# Patient Record
Sex: FEMALE | Race: WHITE | Employment: UNEMPLOYED | URBAN - METROPOLITAN AREA
[De-identification: names, ages, dates, MRNs, and addresses within clinical notes are randomized per-mention and may not be internally consistent; named-entity substitution may affect disease eponyms.]

---

## 2017-06-14 ENCOUNTER — HOSPITAL ENCOUNTER (OUTPATIENT)
Age: 82
Setting detail: SPECIMEN
Discharge: HOME | End: 2017-06-14
Payer: MEDICARE

## 2017-06-14 LAB
-: NORMAL
ABSOLUTE EOS #: 0.3 K/UL (ref 0–0.4)
ABSOLUTE LYMPH #: 2.9 K/UL (ref 1–4.8)
ABSOLUTE MONO #: 0.8 K/UL (ref 0.1–1.2)
ALBUMIN SERPL-MCNC: 4 G/DL (ref 3.5–5.2)
ALBUMIN/GLOBULIN RATIO: 1.4 (ref 1–2.5)
ALP BLD-CCNC: 87 U/L (ref 35–104)
ALT SERPL-CCNC: 12 U/L (ref 5–33)
AMORPHOUS: NORMAL
ANION GAP SERPL CALCULATED.3IONS-SCNC: 15 MMOL/L (ref 9–17)
AST SERPL-CCNC: 20 U/L
BACTERIA: NORMAL
BASOPHILS # BLD: 1 %
BASOPHILS ABSOLUTE: 0.1 K/UL (ref 0–0.2)
BILIRUB SERPL-MCNC: 0.5 MG/DL (ref 0.3–1.2)
BILIRUBIN URINE: NEGATIVE
BUN BLDV-MCNC: 12 MG/DL (ref 8–23)
BUN/CREAT BLD: NORMAL (ref 9–20)
CALCIUM SERPL-MCNC: 9.9 MG/DL (ref 8.6–10.4)
CASTS UA: NORMAL /LPF (ref 0–8)
CHLORIDE BLD-SCNC: 101 MMOL/L (ref 98–107)
CHOLESTEROL, FASTING: 263 MG/DL
CHOLESTEROL/HDL RATIO: 2.1
CO2: 24 MMOL/L (ref 20–31)
COLOR: YELLOW
COMMENT UA: ABNORMAL
CREAT SERPL-MCNC: 0.68 MG/DL (ref 0.5–0.9)
CRYSTALS, UA: NORMAL /HPF
DIFFERENTIAL TYPE: NORMAL
EOSINOPHILS RELATIVE PERCENT: 3 %
EPITHELIAL CELLS UA: NORMAL /HPF (ref 0–5)
GFR AFRICAN AMERICAN: >60 ML/MIN
GFR NON-AFRICAN AMERICAN: >60 ML/MIN
GFR SERPL CREATININE-BSD FRML MDRD: NORMAL ML/MIN/{1.73_M2}
GFR SERPL CREATININE-BSD FRML MDRD: NORMAL ML/MIN/{1.73_M2}
GLUCOSE FASTING: 95 MG/DL (ref 70–99)
GLUCOSE URINE: NEGATIVE
HCT VFR BLD CALC: 44.3 % (ref 36–46)
HDLC SERPL-MCNC: 124 MG/DL
HEMOGLOBIN: 14.5 G/DL (ref 12–16)
KETONES, URINE: NEGATIVE
LDL CHOLESTEROL: 113 MG/DL (ref 0–130)
LEUKOCYTE ESTERASE, URINE: NEGATIVE
LYMPHOCYTES # BLD: 36 %
MCH RBC QN AUTO: 28.8 PG (ref 26–34)
MCHC RBC AUTO-ENTMCNC: 32.7 G/DL (ref 31–37)
MCV RBC AUTO: 88.2 FL (ref 80–100)
MONOCYTES # BLD: 10 %
MUCUS: NORMAL
NITRITE, URINE: NEGATIVE
OTHER OBSERVATIONS UA: NORMAL
PDW BLD-RTO: 15.4 % (ref 12.5–15.4)
PH UA: 7.5 (ref 5–8)
PLATELET # BLD: 398 K/UL (ref 140–450)
PLATELET ESTIMATE: NORMAL
PMV BLD AUTO: 8.6 FL (ref 6–12)
POTASSIUM SERPL-SCNC: 3.9 MMOL/L (ref 3.7–5.3)
PROTEIN UA: NEGATIVE
RBC # BLD: 5.02 M/UL (ref 4–5.2)
RBC # BLD: NORMAL 10*6/UL
RBC UA: NORMAL /HPF (ref 0–4)
RENAL EPITHELIAL, UA: NORMAL /HPF
SEG NEUTROPHILS: 50 %
SEGMENTED NEUTROPHILS ABSOLUTE COUNT: 4.1 K/UL (ref 1.8–7.7)
SODIUM BLD-SCNC: 140 MMOL/L (ref 135–144)
SPECIFIC GRAVITY UA: 1.01 (ref 1–1.03)
TOTAL PROTEIN: 6.8 G/DL (ref 6.4–8.3)
TRICHOMONAS: NORMAL
TRIGLYCERIDE, FASTING: 129 MG/DL
TURBIDITY: ABNORMAL
URINE HGB: NEGATIVE
UROBILINOGEN, URINE: NORMAL
VLDLC SERPL CALC-MCNC: ABNORMAL MG/DL (ref 1–30)
WBC # BLD: 8.2 K/UL (ref 3.5–11)
WBC # BLD: NORMAL 10*3/UL
WBC UA: NORMAL /HPF (ref 0–5)
YEAST: NORMAL

## 2017-06-22 ENCOUNTER — HOSPITAL ENCOUNTER (OUTPATIENT)
Age: 82
Setting detail: SPECIMEN
Discharge: HOME | End: 2017-06-22
Payer: MEDICARE

## 2017-06-22 LAB
BILIRUBIN URINE: NEGATIVE
COLOR: YELLOW
COMMENT UA: NORMAL
GLUCOSE URINE: NEGATIVE
KETONES, URINE: NEGATIVE
LEUKOCYTE ESTERASE, URINE: NEGATIVE
NITRITE, URINE: NEGATIVE
PH UA: 6.5 (ref 5–8)
PROTEIN UA: NEGATIVE
SPECIFIC GRAVITY UA: 1.01 (ref 1–1.03)
TURBIDITY: CLEAR
URINE HGB: NEGATIVE
UROBILINOGEN, URINE: NORMAL

## 2019-04-30 ENCOUNTER — HOSPITAL ENCOUNTER (OUTPATIENT)
Age: 84
Setting detail: SPECIMEN
Discharge: HOME | End: 2019-04-30
Payer: MEDICARE

## 2019-04-30 LAB
ABSOLUTE EOS #: 0.3 K/UL (ref 0–0.44)
ABSOLUTE IMMATURE GRANULOCYTE: <0.03 K/UL (ref 0–0.3)
ABSOLUTE LYMPH #: 2.99 K/UL (ref 1.1–3.7)
ABSOLUTE MONO #: 0.97 K/UL (ref 0.1–1.2)
ALBUMIN SERPL-MCNC: 4.7 G/DL (ref 3.5–5.2)
ALBUMIN/GLOBULIN RATIO: 1.6 (ref 1–2.5)
ALP BLD-CCNC: 103 U/L (ref 35–104)
ALT SERPL-CCNC: 13 U/L (ref 5–33)
ANION GAP SERPL CALCULATED.3IONS-SCNC: 20 MMOL/L (ref 9–17)
AST SERPL-CCNC: 22 U/L
BASOPHILS # BLD: 1 % (ref 0–2)
BASOPHILS ABSOLUTE: 0.11 K/UL (ref 0–0.2)
BILIRUB SERPL-MCNC: 0.48 MG/DL (ref 0.3–1.2)
BILIRUBIN URINE: NEGATIVE
BUN BLDV-MCNC: 14 MG/DL (ref 8–23)
BUN/CREAT BLD: ABNORMAL (ref 9–20)
CALCIUM SERPL-MCNC: 10.2 MG/DL (ref 8.6–10.4)
CHLORIDE BLD-SCNC: 103 MMOL/L (ref 98–107)
CHOLESTEROL, FASTING: 277 MG/DL
CHOLESTEROL/HDL RATIO: 1.9
CO2: 21 MMOL/L (ref 20–31)
COLOR: YELLOW
COMMENT UA: ABNORMAL
CREAT SERPL-MCNC: 0.65 MG/DL (ref 0.5–0.9)
DIFFERENTIAL TYPE: ABNORMAL
EOSINOPHILS RELATIVE PERCENT: 4 % (ref 1–4)
GFR AFRICAN AMERICAN: >60 ML/MIN
GFR NON-AFRICAN AMERICAN: >60 ML/MIN
GFR SERPL CREATININE-BSD FRML MDRD: ABNORMAL ML/MIN/{1.73_M2}
GFR SERPL CREATININE-BSD FRML MDRD: ABNORMAL ML/MIN/{1.73_M2}
GLUCOSE FASTING: 85 MG/DL (ref 70–99)
GLUCOSE URINE: NEGATIVE
HCT VFR BLD CALC: 48.8 % (ref 36.3–47.1)
HDLC SERPL-MCNC: 144 MG/DL
HEMOGLOBIN: 15.4 G/DL (ref 11.9–15.1)
IMMATURE GRANULOCYTES: 0 %
KETONES, URINE: NEGATIVE
LDL CHOLESTEROL: 113 MG/DL (ref 0–130)
LEUKOCYTE ESTERASE, URINE: NEGATIVE
LYMPHOCYTES # BLD: 34 % (ref 24–43)
MCH RBC QN AUTO: 29.6 PG (ref 25.2–33.5)
MCHC RBC AUTO-ENTMCNC: 31.6 G/DL (ref 28.4–34.8)
MCV RBC AUTO: 93.7 FL (ref 82.6–102.9)
MONOCYTES # BLD: 11 % (ref 3–12)
NITRITE, URINE: NEGATIVE
NRBC AUTOMATED: 0 PER 100 WBC
PDW BLD-RTO: 14.5 % (ref 11.8–14.4)
PH UA: 7 (ref 5–8)
PLATELET # BLD: 418 K/UL (ref 138–453)
PLATELET ESTIMATE: ABNORMAL
PMV BLD AUTO: 10.5 FL (ref 8.1–13.5)
POTASSIUM SERPL-SCNC: 3.8 MMOL/L (ref 3.7–5.3)
PROTEIN UA: NEGATIVE
RBC # BLD: 5.21 M/UL (ref 3.95–5.11)
RBC # BLD: ABNORMAL 10*6/UL
SEG NEUTROPHILS: 50 % (ref 36–65)
SEGMENTED NEUTROPHILS ABSOLUTE COUNT: 4.29 K/UL (ref 1.5–8.1)
SODIUM BLD-SCNC: 144 MMOL/L (ref 135–144)
SPECIFIC GRAVITY UA: 1 (ref 1–1.03)
THYROXINE, FREE: 1.56 NG/DL (ref 0.93–1.7)
TOTAL PROTEIN: 7.7 G/DL (ref 6.4–8.3)
TRIGLYCERIDE, FASTING: 101 MG/DL
TSH SERPL DL<=0.05 MIU/L-ACNC: 2.2 MIU/L (ref 0.3–5)
TURBIDITY: CLEAR
URINE HGB: NEGATIVE
UROBILINOGEN, URINE: NORMAL
VLDLC SERPL CALC-MCNC: ABNORMAL MG/DL (ref 1–30)
WBC # BLD: 8.7 K/UL (ref 3.5–11.3)
WBC # BLD: ABNORMAL 10*3/UL

## 2019-05-06 ENCOUNTER — HOSPITAL ENCOUNTER (OUTPATIENT)
Age: 84
Setting detail: SPECIMEN
Discharge: HOME | End: 2019-05-06
Payer: MEDICARE

## 2019-05-06 LAB
HCT VFR BLD CALC: 47 % (ref 36.3–47.1)
HEMOGLOBIN: 14.8 G/DL (ref 11.9–15.1)
MCH RBC QN AUTO: 29.6 PG (ref 25.2–33.5)
MCHC RBC AUTO-ENTMCNC: 31.5 G/DL (ref 28.4–34.8)
MCV RBC AUTO: 94 FL (ref 82.6–102.9)
NRBC AUTOMATED: 0 PER 100 WBC
PDW BLD-RTO: 14.3 % (ref 11.8–14.4)
PLATELET # BLD: 419 K/UL (ref 138–453)
PMV BLD AUTO: 10.7 FL (ref 8.1–13.5)
RBC # BLD: 5 M/UL (ref 3.95–5.11)
WBC # BLD: 8.9 K/UL (ref 3.5–11.3)

## 2020-02-12 ENCOUNTER — HOSPITAL ENCOUNTER (OUTPATIENT)
Age: 85
Setting detail: SPECIMEN
Discharge: HOME | End: 2020-02-12
Payer: MEDICARE

## 2020-02-12 LAB
ABSOLUTE EOS #: 0.17 K/UL (ref 0–0.44)
ABSOLUTE IMMATURE GRANULOCYTE: 0.03 K/UL (ref 0–0.3)
ABSOLUTE LYMPH #: 2.97 K/UL (ref 1.1–3.7)
ABSOLUTE MONO #: 0.99 K/UL (ref 0.1–1.2)
ALBUMIN SERPL-MCNC: 4.5 G/DL (ref 3.5–5.2)
ALBUMIN/GLOBULIN RATIO: 1.6 (ref 1–2.5)
ALP BLD-CCNC: 97 U/L (ref 35–104)
ALT SERPL-CCNC: 15 U/L (ref 5–33)
ANION GAP SERPL CALCULATED.3IONS-SCNC: 18 MMOL/L (ref 9–17)
AST SERPL-CCNC: 23 U/L
BASOPHILS # BLD: 1 % (ref 0–2)
BASOPHILS ABSOLUTE: 0.1 K/UL (ref 0–0.2)
BILIRUB SERPL-MCNC: 0.33 MG/DL (ref 0.3–1.2)
BILIRUBIN URINE: NEGATIVE
BUN BLDV-MCNC: 10 MG/DL (ref 8–23)
BUN/CREAT BLD: ABNORMAL (ref 9–20)
CALCIUM SERPL-MCNC: 9.9 MG/DL (ref 8.6–10.4)
CHLORIDE BLD-SCNC: 98 MMOL/L (ref 98–107)
CO2: 24 MMOL/L (ref 20–31)
COLOR: YELLOW
COMMENT UA: NORMAL
CREAT SERPL-MCNC: 0.64 MG/DL (ref 0.5–0.9)
DIFFERENTIAL TYPE: ABNORMAL
EOSINOPHILS RELATIVE PERCENT: 2 % (ref 1–4)
GFR AFRICAN AMERICAN: >60 ML/MIN
GFR NON-AFRICAN AMERICAN: >60 ML/MIN
GFR SERPL CREATININE-BSD FRML MDRD: ABNORMAL ML/MIN/{1.73_M2}
GFR SERPL CREATININE-BSD FRML MDRD: ABNORMAL ML/MIN/{1.73_M2}
GLUCOSE BLD-MCNC: 53 MG/DL (ref 70–99)
GLUCOSE URINE: NEGATIVE
HCT VFR BLD CALC: 52 % (ref 36.3–47.1)
HEMOGLOBIN: 16 G/DL (ref 11.9–15.1)
IMMATURE GRANULOCYTES: 0 %
KETONES, URINE: NEGATIVE
LEUKOCYTE ESTERASE, URINE: NEGATIVE
LYMPHOCYTES # BLD: 33 % (ref 24–43)
MCH RBC QN AUTO: 28.9 PG (ref 25.2–33.5)
MCHC RBC AUTO-ENTMCNC: 30.8 G/DL (ref 28.4–34.8)
MCV RBC AUTO: 94 FL (ref 82.6–102.9)
MONOCYTES # BLD: 11 % (ref 3–12)
NITRITE, URINE: NEGATIVE
NRBC AUTOMATED: 0 PER 100 WBC
PDW BLD-RTO: 14.5 % (ref 11.8–14.4)
PH UA: 6 (ref 5–8)
PLATELET # BLD: 418 K/UL (ref 138–453)
PLATELET ESTIMATE: ABNORMAL
PMV BLD AUTO: 10.5 FL (ref 8.1–13.5)
POTASSIUM SERPL-SCNC: 3.9 MMOL/L (ref 3.7–5.3)
PROTEIN UA: NEGATIVE
RBC # BLD: 5.53 M/UL (ref 3.95–5.11)
RBC # BLD: ABNORMAL 10*6/UL
SEG NEUTROPHILS: 53 % (ref 36–65)
SEGMENTED NEUTROPHILS ABSOLUTE COUNT: 4.73 K/UL (ref 1.5–8.1)
SODIUM BLD-SCNC: 140 MMOL/L (ref 135–144)
SPECIFIC GRAVITY UA: 1.01 (ref 1–1.03)
TOTAL PROTEIN: 7.3 G/DL (ref 6.4–8.3)
TSH SERPL DL<=0.05 MIU/L-ACNC: 1.46 MIU/L (ref 0.3–5)
TURBIDITY: CLEAR
URINE HGB: NEGATIVE
UROBILINOGEN, URINE: NORMAL
WBC # BLD: 9 K/UL (ref 3.5–11.3)
WBC # BLD: ABNORMAL 10*3/UL

## 2021-07-23 PROBLEM — E16.2 LOW BLOOD SUGAR: Status: ACTIVE | Noted: 2021-07-23

## 2021-07-23 PROBLEM — D58.2 ELEVATED HEMOGLOBIN (HCC): Status: ACTIVE | Noted: 2021-07-23

## 2021-07-23 PROBLEM — E78.00 HYPERCHOLESTEREMIA: Status: ACTIVE | Noted: 2021-07-23

## 2021-07-23 PROBLEM — E04.1 THYROID NODULE: Status: ACTIVE | Noted: 2021-07-23

## 2021-07-23 PROBLEM — Z85.828 HX OF BASAL CELL CARCINOMA: Status: ACTIVE | Noted: 2021-07-23

## 2021-07-23 PROBLEM — I10 HYPERTENSION: Status: ACTIVE | Noted: 2021-07-23

## 2021-07-23 PROBLEM — F32.A MILD DEPRESSION: Status: ACTIVE | Noted: 2021-07-23

## 2021-07-23 PROBLEM — I77.9 BILATERAL CAROTID ARTERY DISEASE (HCC): Status: ACTIVE | Noted: 2021-07-23

## 2021-08-04 ENCOUNTER — ANESTHESIA EVENT (OUTPATIENT)
Dept: OPERATING ROOM | Age: 86
End: 2021-08-04
Payer: MEDICARE

## 2021-08-04 RX ORDER — M-VIT,TX,IRON,MINS/CALC/FOLIC 27MG-0.4MG
1 TABLET ORAL DAILY
COMMUNITY

## 2021-08-09 ENCOUNTER — ANESTHESIA (OUTPATIENT)
Dept: OPERATING ROOM | Age: 86
End: 2021-08-09
Payer: MEDICARE

## 2021-08-09 ENCOUNTER — HOSPITAL ENCOUNTER (OUTPATIENT)
Age: 86
Setting detail: OUTPATIENT SURGERY
Discharge: HOME | End: 2021-08-09
Attending: SURGERY | Admitting: SURGERY
Payer: MEDICARE

## 2021-08-09 VITALS
WEIGHT: 101 LBS | DIASTOLIC BLOOD PRESSURE: 73 MMHG | SYSTOLIC BLOOD PRESSURE: 145 MMHG | BODY MASS INDEX: 18.58 KG/M2 | HEIGHT: 62 IN | RESPIRATION RATE: 16 BRPM | OXYGEN SATURATION: 98 % | TEMPERATURE: 97.3 F | HEART RATE: 64 BPM

## 2021-08-09 VITALS
DIASTOLIC BLOOD PRESSURE: 53 MMHG | SYSTOLIC BLOOD PRESSURE: 111 MMHG | RESPIRATION RATE: 11 BRPM | OXYGEN SATURATION: 100 %

## 2021-08-09 PROCEDURE — 3700000001 HC ADD 15 MINUTES (ANESTHESIA): Performed by: SURGERY

## 2021-08-09 PROCEDURE — 2500000003 HC RX 250 WO HCPCS: Performed by: NURSE ANESTHETIST, CERTIFIED REGISTERED

## 2021-08-09 PROCEDURE — 3600000003 HC SURGERY LEVEL 3 BASE: Performed by: SURGERY

## 2021-08-09 PROCEDURE — 3700000000 HC ANESTHESIA ATTENDED CARE: Performed by: SURGERY

## 2021-08-09 PROCEDURE — 6360000002 HC RX W HCPCS: Performed by: NURSE ANESTHETIST, CERTIFIED REGISTERED

## 2021-08-09 PROCEDURE — 2580000003 HC RX 258: Performed by: ANESTHESIOLOGY

## 2021-08-09 PROCEDURE — 7100000010 HC PHASE II RECOVERY - FIRST 15 MIN: Performed by: SURGERY

## 2021-08-09 PROCEDURE — 2500000003 HC RX 250 WO HCPCS: Performed by: SURGERY

## 2021-08-09 PROCEDURE — 2709999900 HC NON-CHARGEABLE SUPPLY: Performed by: SURGERY

## 2021-08-09 PROCEDURE — 2580000003 HC RX 258: Performed by: SURGERY

## 2021-08-09 PROCEDURE — 3600000013 HC SURGERY LEVEL 3 ADDTL 15MIN: Performed by: SURGERY

## 2021-08-09 PROCEDURE — 88305 TISSUE EXAM BY PATHOLOGIST: CPT

## 2021-08-09 PROCEDURE — 6370000000 HC RX 637 (ALT 250 FOR IP): Performed by: SURGERY

## 2021-08-09 PROCEDURE — 7100000011 HC PHASE II RECOVERY - ADDTL 15 MIN: Performed by: SURGERY

## 2021-08-09 RX ORDER — SODIUM CHLORIDE 0.9 % (FLUSH) 0.9 %
10 SYRINGE (ML) INJECTION PRN
Status: DISCONTINUED | OUTPATIENT
Start: 2021-08-09 | End: 2021-08-09 | Stop reason: HOSPADM

## 2021-08-09 RX ORDER — FENTANYL CITRATE 50 UG/ML
25 INJECTION, SOLUTION INTRAMUSCULAR; INTRAVENOUS EVERY 5 MIN PRN
Status: DISCONTINUED | OUTPATIENT
Start: 2021-08-09 | End: 2021-08-09 | Stop reason: HOSPADM

## 2021-08-09 RX ORDER — LIDOCAINE HYDROCHLORIDE 20 MG/ML
JELLY TOPICAL
Status: DISCONTINUED
Start: 2021-08-09 | End: 2021-08-09 | Stop reason: WASHOUT

## 2021-08-09 RX ORDER — PHENYLEPHRINE HCL IN 0.9% NACL 1 MG/10 ML
SYRINGE (ML) INTRAVENOUS PRN
Status: DISCONTINUED | OUTPATIENT
Start: 2021-08-09 | End: 2021-08-09 | Stop reason: SDUPTHER

## 2021-08-09 RX ORDER — SODIUM CHLORIDE 9 MG/ML
INJECTION, SOLUTION INTRAVENOUS CONTINUOUS
Status: DISCONTINUED | OUTPATIENT
Start: 2021-08-09 | End: 2021-08-09 | Stop reason: HOSPADM

## 2021-08-09 RX ORDER — BUPIVACAINE HYDROCHLORIDE AND EPINEPHRINE 5; 5 MG/ML; UG/ML
INJECTION, SOLUTION PERINEURAL
Status: DISCONTINUED
Start: 2021-08-09 | End: 2021-08-09 | Stop reason: WASHOUT

## 2021-08-09 RX ORDER — SODIUM CHLORIDE 9 MG/ML
25 INJECTION, SOLUTION INTRAVENOUS PRN
Status: DISCONTINUED | OUTPATIENT
Start: 2021-08-09 | End: 2021-08-09 | Stop reason: HOSPADM

## 2021-08-09 RX ORDER — HYDRALAZINE HYDROCHLORIDE 20 MG/ML
5 INJECTION INTRAMUSCULAR; INTRAVENOUS EVERY 10 MIN PRN
Status: DISCONTINUED | OUTPATIENT
Start: 2021-08-09 | End: 2021-08-09 | Stop reason: HOSPADM

## 2021-08-09 RX ORDER — ONDANSETRON 2 MG/ML
4 INJECTION INTRAMUSCULAR; INTRAVENOUS
Status: DISCONTINUED | OUTPATIENT
Start: 2021-08-09 | End: 2021-08-09 | Stop reason: HOSPADM

## 2021-08-09 RX ORDER — SODIUM CHLORIDE 0.9 % (FLUSH) 0.9 %
10 SYRINGE (ML) INJECTION EVERY 12 HOURS SCHEDULED
Status: DISCONTINUED | OUTPATIENT
Start: 2021-08-09 | End: 2021-08-09 | Stop reason: HOSPADM

## 2021-08-09 RX ORDER — FENTANYL CITRATE 50 UG/ML
INJECTION, SOLUTION INTRAMUSCULAR; INTRAVENOUS PRN
Status: DISCONTINUED | OUTPATIENT
Start: 2021-08-09 | End: 2021-08-09 | Stop reason: SDUPTHER

## 2021-08-09 RX ORDER — CEFAZOLIN SODIUM 1 G/3ML
INJECTION, POWDER, FOR SOLUTION INTRAMUSCULAR; INTRAVENOUS PRN
Status: DISCONTINUED | OUTPATIENT
Start: 2021-08-09 | End: 2021-08-09 | Stop reason: SDUPTHER

## 2021-08-09 RX ORDER — PROMETHAZINE HYDROCHLORIDE 25 MG/ML
6.25 INJECTION, SOLUTION INTRAMUSCULAR; INTRAVENOUS
Status: DISCONTINUED | OUTPATIENT
Start: 2021-08-09 | End: 2021-08-09 | Stop reason: HOSPADM

## 2021-08-09 RX ORDER — PROPOFOL 10 MG/ML
INJECTION, EMULSION INTRAVENOUS
Status: COMPLETED
Start: 2021-08-09 | End: 2021-08-09

## 2021-08-09 RX ORDER — BUPIVACAINE HYDROCHLORIDE AND EPINEPHRINE 2.5; 5 MG/ML; UG/ML
INJECTION, SOLUTION INFILTRATION; PERINEURAL PRN
Status: DISCONTINUED | OUTPATIENT
Start: 2021-08-09 | End: 2021-08-09 | Stop reason: ALTCHOICE

## 2021-08-09 RX ORDER — SODIUM CHLORIDE, SODIUM LACTATE, POTASSIUM CHLORIDE, CALCIUM CHLORIDE 600; 310; 30; 20 MG/100ML; MG/100ML; MG/100ML; MG/100ML
INJECTION, SOLUTION INTRAVENOUS CONTINUOUS
Status: DISCONTINUED | OUTPATIENT
Start: 2021-08-09 | End: 2021-08-09 | Stop reason: HOSPADM

## 2021-08-09 RX ORDER — MAGNESIUM HYDROXIDE 1200 MG/15ML
LIQUID ORAL CONTINUOUS PRN
Status: COMPLETED | OUTPATIENT
Start: 2021-08-09 | End: 2021-08-09

## 2021-08-09 RX ORDER — DIPHENHYDRAMINE HYDROCHLORIDE 50 MG/ML
12.5 INJECTION INTRAMUSCULAR; INTRAVENOUS
Status: DISCONTINUED | OUTPATIENT
Start: 2021-08-09 | End: 2021-08-09 | Stop reason: HOSPADM

## 2021-08-09 RX ORDER — BUPIVACAINE HYDROCHLORIDE AND EPINEPHRINE 2.5; 5 MG/ML; UG/ML
INJECTION, SOLUTION INFILTRATION; PERINEURAL
Status: DISCONTINUED
Start: 2021-08-09 | End: 2021-08-09 | Stop reason: HOSPADM

## 2021-08-09 RX ORDER — LIDOCAINE HYDROCHLORIDE 10 MG/ML
INJECTION, SOLUTION INFILTRATION; PERINEURAL PRN
Status: DISCONTINUED | OUTPATIENT
Start: 2021-08-09 | End: 2021-08-09 | Stop reason: SDUPTHER

## 2021-08-09 RX ORDER — BUPIVACAINE HYDROCHLORIDE 2.5 MG/ML
INJECTION, SOLUTION EPIDURAL; INFILTRATION; INTRACAUDAL
Status: DISCONTINUED
Start: 2021-08-09 | End: 2021-08-09 | Stop reason: HOSPADM

## 2021-08-09 RX ORDER — ULTRASOUND COUPLING MEDIUM
GEL (GRAM) TOPICAL PRN
Status: DISCONTINUED | OUTPATIENT
Start: 2021-08-09 | End: 2021-08-09 | Stop reason: ALTCHOICE

## 2021-08-09 RX ORDER — PROPOFOL 10 MG/ML
INJECTION, EMULSION INTRAVENOUS PRN
Status: DISCONTINUED | OUTPATIENT
Start: 2021-08-09 | End: 2021-08-09 | Stop reason: SDUPTHER

## 2021-08-09 RX ORDER — IBUPROFEN 600 MG/1
600 TABLET ORAL 4 TIMES DAILY PRN
Qty: 60 TABLET | Refills: 0 | Status: SHIPPED | OUTPATIENT
Start: 2021-08-09

## 2021-08-09 RX ORDER — MEPERIDINE HYDROCHLORIDE 50 MG/ML
12.5 INJECTION INTRAMUSCULAR; INTRAVENOUS; SUBCUTANEOUS EVERY 5 MIN PRN
Status: DISCONTINUED | OUTPATIENT
Start: 2021-08-09 | End: 2021-08-09 | Stop reason: HOSPADM

## 2021-08-09 RX ADMIN — PROPOFOL INJECTABLE EMULSION 50 MG: 10 INJECTION, EMULSION INTRAVENOUS at 11:46

## 2021-08-09 RX ADMIN — CEFAZOLIN 100 MG: 1 INJECTION, POWDER, FOR SOLUTION INTRAMUSCULAR; INTRAVENOUS at 11:51

## 2021-08-09 RX ADMIN — PROPOFOL INJECTABLE EMULSION 100 MCG/KG/MIN: 10 INJECTION, EMULSION INTRAVENOUS at 11:47

## 2021-08-09 RX ADMIN — CEFAZOLIN 100 MG: 1 INJECTION, POWDER, FOR SOLUTION INTRAMUSCULAR; INTRAVENOUS at 11:49

## 2021-08-09 RX ADMIN — FENTANYL CITRATE 25 MCG: 50 INJECTION, SOLUTION INTRAMUSCULAR; INTRAVENOUS at 13:07

## 2021-08-09 RX ADMIN — SODIUM CHLORIDE, POTASSIUM CHLORIDE, SODIUM LACTATE AND CALCIUM CHLORIDE: 600; 310; 30; 20 INJECTION, SOLUTION INTRAVENOUS at 10:32

## 2021-08-09 RX ADMIN — FENTANYL CITRATE 25 MCG: 50 INJECTION, SOLUTION INTRAMUSCULAR; INTRAVENOUS at 12:18

## 2021-08-09 RX ADMIN — SODIUM CHLORIDE, POTASSIUM CHLORIDE, SODIUM LACTATE AND CALCIUM CHLORIDE: 600; 310; 30; 20 INJECTION, SOLUTION INTRAVENOUS at 12:45

## 2021-08-09 RX ADMIN — CEFAZOLIN 800 MG: 1 INJECTION, POWDER, FOR SOLUTION INTRAMUSCULAR; INTRAVENOUS at 12:08

## 2021-08-09 RX ADMIN — FENTANYL CITRATE 25 MCG: 50 INJECTION, SOLUTION INTRAMUSCULAR; INTRAVENOUS at 11:51

## 2021-08-09 RX ADMIN — PROPOFOL INJECTABLE EMULSION 30 MG: 10 INJECTION, EMULSION INTRAVENOUS at 12:18

## 2021-08-09 RX ADMIN — Medication 100 MCG: at 12:57

## 2021-08-09 RX ADMIN — FENTANYL CITRATE 25 MCG: 50 INJECTION, SOLUTION INTRAMUSCULAR; INTRAVENOUS at 11:47

## 2021-08-09 RX ADMIN — LIDOCAINE HYDROCHLORIDE 50 MG: 10 INJECTION, SOLUTION INFILTRATION; PERINEURAL at 11:46

## 2021-08-09 ASSESSMENT — PULMONARY FUNCTION TESTS
PIF_VALUE: 0

## 2021-08-09 ASSESSMENT — PAIN SCALES - GENERAL
PAINLEVEL_OUTOF10: 0

## 2021-08-09 ASSESSMENT — PAIN - FUNCTIONAL ASSESSMENT: PAIN_FUNCTIONAL_ASSESSMENT: 0-10

## 2021-08-09 NOTE — ANESTHESIA POSTPROCEDURE EVALUATION
POST- ANESTHESIA EVALUATION       Pt Name: Rodrigo Ayala  MRN: [id number]  [place]: 1935  Date of evaluation: 8/9/2021  Time:  1:32 PM      BP (!) 141/63   Pulse 83   Temp 96.7 °F (35.9 °C) (Infrared)   Resp 16   Ht 5' 2\" (1.575 m)   Wt 101 lb (45.8 kg)   SpO2 98%   BMI 18.47 kg/m²      Consciousness Level  Awake  Cardiopulmonary Status  Stable  Pain Adequately Treated YES  Nausea / Vomiting  NO  Adequate Hydration  YES  Anesthesia Related Complications NONE      Electronically signed by Tino Henning MD on 8/9/2021 at 1:32 PM       Department of Anesthesiology  Postprocedure Note    Patient: Rodrigo Ayala  MRN: [id number]  YOB: 1935  Date of evaluation: 8/9/2021  Time:  1:32 PM     Procedure Summary     Date: 08/09/21 Room / Location: [address]    Anesthesia Start: 6974 Anesthesia Stop: 3188    Procedures:       LEFT SHOULDER LESION BIOPSY EXCISION X 3 (Left )      RIGHT BACK LESION BIOPSY EXCISION X 2 (Right )      HEMORRHOIDECTOMY (N/A )      COLONOSCOPY WITH BIOPSY (N/A ) Diagnosis:       (MULTIPLE LESIONS - LEFT SHOULDER / RIGHT BACK)      (COLON SCREEN)    Surgeons: Derian Galvan DO Responsible Provider: Tino Henning MD    Anesthesia Type: MAC, general ASA Status: 3          Anesthesia Type: MAC, general    Margarito Phase I: Margarito Score: 9    Margarito Phase II:      Last vitals: Reviewed and per EMR flowsheets.        Anesthesia Post Evaluation

## 2021-08-09 NOTE — ANESTHESIA PRE PROCEDURE
dep[facility] ([facility] F32.0       Past Medical History:        Diagnosis Date    Bilateral carotid artery disease (HCC)     Elevated hemoglobin (HCC)     Hx of basal cell carcinoma     Hypertension     Low blood sugar     Mild depression (HCC)     Thyroid nodule        Past Surgical History:        Procedure Laterality Date    APPENDECTOMY      BREAST SURGERY      I&D breast abscess age 8   [facility] CHOLECYSTECTOMY      HYSTERECTOMY      TONSILLECTOMY         Social History:    Social History     Tobacco Use    Smoking status: Former Smoker   Substance Use Topics    Alcohol use: Yes                                Counseling given: Not Answered      Vital Signs (Current): There were no vitals filed for this visit. BP Readings from Last 3 Encounters:   No data found for BP       NPO Status:                                                                                 BMI:   Wt Readings from Last 3 Encounters:   07/26/21 104 lb (47.2 kg)     There is no height or weight on file to calculate BMI.    CBC:   Lab Results   Component Value Date    WBC 9.0 02/12/2020    RBC 5.53 02/12/2020    HGB 16.0 02/12/2020    HCT 52.0 02/12/2020    MCV 94.0 02/12/2020    RDW 14.5 02/12/2020     02/12/2020       CMP:   Lab Results   Component Value Date     02/12/2020    K 3.9 02/12/2020    CL 98 02/12/2020    CO2 24 02/12/2020    BUN 10 02/12/2020    CREATININE 0.64 02/12/2020    GFRAA >60 02/12/2020    LABGLOM >60 02/12/2020    GLUCOSE 53 02/12/2020    PROT 7.3 02/12/2020    CALCIUM 9.9 02/12/2020    BILITOT 0.33 02/12/2020    ALKPHOS 97 02/12/2020    AST 23 02/12/2020    ALT 15 02/12/2020       POC Tests: No results for input(s): POCGLU, POCNA, POCK, POCCL, POCBUN, POCHEMO, POCHCT in the last 72 hours.     Coags: No results found for: PROTIME, INR, APTT    HCG (If Applicable): No results found for: PREGTESTUR, PREGSERUM, HCG, HCGQUANT     ABGs: No results found for: PHART, PO2ART, HQN6GZA, XVG2BTH, BEART, S2VJHBZQ     Type & Screen (If Applicable):  No results found for: LABABO, LABRH    Drug/Infectious Status (If Applicable):  No results found for: HIV, HEPCAB    COVID-19 Screening (If Applicable): No results found for: COVID19        Anesthesia Evaluation  Patient summary reviewed and Nursing notes reviewed no history of anesthetic complications:   Airway: Mallampati: I  TM distance: >3 FB   Neck ROM: full  Mouth opening: > = 3 FB Dental: normal exam         Pulmonary:Negative Pulmonary ROS and normal exam  breath sounds clear to auscultation                             Cardiovascular:    (+) hypertension: no interval change, hyperlipidemia        Rhythm: regular  Rate: normal                    Neuro/Psych:   (+) psychiatric history:depression/anxiety             GI/Hepatic/Renal: Neg GI/Hepatic/Renal ROS            Endo/Other:    (+) malignancy/cancer. Abdominal:       Abdomen: soft. Vascular:   + PVD, aortic or cerebral, . ROS comment: Bilateral carotid artery disease. Other Findings:           Anesthesia Plan      MAC and general     ASA 3             Anesthetic plan and risks discussed with patient. Plan discussed with CRNA.                   Marco A Maria MD   8/9/2021

## 2021-08-09 NOTE — H&P
y  Expand AllCollapse All    Subjective:    Rudi Drake is an 83 y/o female with multiple complaints but initially wasn't sure why she was here. She noted one day of severe diarrhea in April, May, and June, with no blood and nothing since. Though not sure why her doctor referred her for colonoscopy, she now understands that this is the reason. Denies abdominal pain but has had some weight loss though not with a clear reason. Also has some skin lesions that are changing and she has a hard time monitoring. She has a hx of basal cell ca so any skin lesion concerns her. Also has a symptomatic hemorrhoid that causes discomfort and can bleed. She is under stress due to her 83 y/o  and nutty farmer neighbor.     Body mass index is 19.65 kg/m². Vitals:     07/26/21 1547   Pulse: 70   Resp: 18   SpO2: 98%           Allergies   Allergen Reactions    Lisinopril      Monosodium Glutamate      Morphine      Penicillins        Family History         Family History   Problem Relation Age of Onset    Hypertension Father           Social History               Socioeconomic History    Marital status:        Spouse name: Not on file    Number of children: Not on file    Years of education: Not on file    Highest education level: Not on file   Occupational History    Not on file   Tobacco Use    Smoking status: Former Smoker   Substance and Sexual Activity    Alcohol use: Yes    Drug use: Not on file    Sexual activity: Not on file   Other Topics Concern    Not on file   Social History Narrative    Not on file      Social Determinants of Health          Financial Resource Strain:     Difficulty of Paying Living Expenses:    Food Insecurity:     Worried About Running Out of Food in the Last Year:     [address] in the Last Year:    Transportation Needs:     Lack of Transportation (Medical):      Lack of Transportation (Non-Medical):    Physical Activity:     Days of Exercise per Week:

## 2021-08-09 NOTE — BRIEF OP NOTE
Brief Postoperative Note      Patient: Elsy Lay  YOB: 1935  MRN: [id number]    Date of Procedure: 8/9/2021    Pre-Op Diagnosis: MULTIPLE LESIONS - LEFT SHOULDER (3)/ RIGHT BACK(1)  COLON DIARRHEA SYMPTOMATIC HEMORRHOIDS      Post-Op Diagnosis: Same  Procedure:COLONOSCOPY WITH BIOPSIES (RANDOM) EXCISION OF 2 HEMORRHOIDS,EXCISION OF 1 LESION OF SKIN RIGHT BACK AND 3 LEFT SHOULDER/BACK       Surgeon(s):  DO Dae Ford DO    Assistant:  * No surgical staff found *    Anesthesia: Monitor Anesthesia Care    Estimated Blood Loss (mL): Minimal    Complications: None    Specimens:   ID Type Source Tests Collected by Time Destination   A : ASCENDING COLON Tissue Colon-Ascending SURGICAL PATHOLOGY Dae Ford,  8/9/2021 1159    B : SIGMOID Tissue Sigmoid Colon SURGICAL PATHOLOGY Dae Ford,  8/9/2021 1200    C : [facility], DO 8/9/2021 1221    D : LESION RIGHT LATERAL BACK Tissue Tissue SURGICAL PATHOLOGY Dae Ford,  8/9/2021 1226    E : Left shoulder lesion Tissue Tissue SURGICAL PATHOLOGY Dae Ford,  8/9/2021 1242    F : Left scapular lesion Tissue Tissue SURGICAL PATHOLOGY Dae Ford,  8/9/2021 1252    G : Left upper back Tissue Tissue [address], DO 8/9/2021 1254        Implants:  * No implants in log *      Drains: * No LDAs found *    Findings: AS ABOVE    Electronically signed by Dae Ford DO on 8/9/2021 at 1:18 PM

## 2021-08-10 LAB — SURGICAL PATHOLOGY REPORT: NORMAL

## 2021-08-10 NOTE — OP NOTE
[address]                  [facility] [address]                                OPERATIVE REPORT    PATIENT NAME: Gilma Tanner                     :        1935  MED REC NO:   [id number]                             ROOM:  ACCOUNT NO:   [de-identified]                           ADMIT DATE: 2021  PROVIDER:     Zoran Farias    DATE OF PROCEDURE:  2021    PREOPERATIVE DIAGNOSES:  Multiple lesions left shoulder (three) and  right back (one), intermittent diarrhea and symptomatic hemorrhoids. POSTOPERATIVE DIAGNOSES:  Multiple lesions left shoulder (three) and  right back (one), intermittent diarrhea and symptomatic hemorrhoids. PROCEDURES:  Colonoscopy with random biopsies, excision of two  hemorrhoids, excision of one lesion of the skin on the right back and  three on the left shoulder/back. SURGEON:  Zoran Farias DO    CLINICAL INDICATIONS:  As above. DESCRIPTION OF PROCEDURE:  The patient was placed in the lateral  decubitus position. Anorectal exam reveals no palpable masses. The  fiberoptic Olympus colonoscope was lubricated, advanced into the  anorectal area and up through an excellently prepped colon to the cecum. We did identify the ileocecal valve and then carefully withdrew the  scope and examined the bowel wall. No evidence of tumors or polyps were  noted. No gross evidence of colitis was apparent. Random biopsies were  taken of the ascending and sigmoid colon. We did notice several  diverticula in the distal sigmoid, but otherwise no other findings. As  the scope was withdrawn, the bowel was partially decompressed,  retroversion does well visualize the rectum and no abnormalities were  noted other than the hemorrhoids.   These were mild internal hemorrhoids  and on removal of the scope and visual exam, the _____ hemorrhoid was  noted at the left anterior to lateral position with a second hemorrhoid  in the right posterior position which appears to be a small thrombotic  hemorrhoid. At this point, the scope has been withdrawn and removed and the patient  may continue on colon cancer surveillance with colonoscopy if needed. At this stage she is 82, so routine colonoscopy in the absence of other  risk factors would not be necessary. The anal area was then prepped with Betadine and the hemorrhoid was well  anesthetized with 0.25% Marcaine with epinephrine. It was clamped and  excised and the base was ligated with a running interlocked suture of  3-0 chromic. Subsequently, the small hemorrhoid in the right posterior  position was opened and the small cotton vessel are removed. This area  is inspected for hemostasis and that is achieved easily with pressure. Subsequent to this the patient was maintained in the lateral decubitus  position and we were able to visualize the skin lesion on the right  back. Several others are very small and have been present there for  many years and are typical of seborrheic keratoses and hence the patient  was recommended that these need not be excised. The significant lesion  is noted on the lateral back below the scapula. This area was well  anesthetized after being well prepped and changing the gloves and  instruments. It was anesthetized with 0.25% Marcaine with epinephrine  and then sharply excised. The subcutaneous tissue is approximated with  3-0 Vicryl. Skin was closed with a running subcuticular suture of 4-0  Monocryl, later Dermabond and a sterile dressing were applied. The patient was then repositioned to allow visualization of three  lesions on the left upper shoulder area. We note, one is a small 3 mm  lesion now which is raised and quite dark, two others which are flat,  irregular and variegated brown color are noted over the scapula in more  laterally over the lateral shoulder.   Both of these were similarly  excised but were closed with sutures of 4-0 nylon after the subcutaneous  tissue was approximated with 3-0 Vicryl. The very small lesion was  similarly anesthetized and excised, but it is closed with subcuticular  sutures of 4-0 Monocryl and covered with a Dermabond. All areas are  dressed. All needle, sponge and instrument counts were declared  correct. It should be noted that the time-out information was confirmed  at the beginning of the procedure. Blood loss is minimal, less than 3  mL. All instruments, sponge and needle counts were declared correct and  the patient was taken to the recovery area in stable condition.         Pearl Guillaume    D: 08/10/2021 14:00:06       T: 08/10/2021 14:05:19     [id number]  Job#: [id number]     Doc#: [id number]    CC:

## 2022-11-07 ENCOUNTER — HOSPITAL ENCOUNTER (OUTPATIENT)
Dept: PREADMISSION TESTING | Age: 87
Discharge: HOME | End: 2022-11-11
Payer: MEDICARE

## 2022-11-07 VITALS
BODY MASS INDEX: 19.81 KG/M2 | HEART RATE: 82 BPM | DIASTOLIC BLOOD PRESSURE: 68 MMHG | HEIGHT: 61 IN | OXYGEN SATURATION: 97 % | SYSTOLIC BLOOD PRESSURE: 146 MMHG | RESPIRATION RATE: 16 BRPM | WEIGHT: 104.94 LBS | TEMPERATURE: 98.2 F

## 2022-11-07 LAB
ABO/RH: NORMAL
ABSOLUTE EOS #: 0.09 K/UL (ref 0–0.44)
ABSOLUTE IMMATURE GRANULOCYTE: 0.04 K/UL (ref 0–0.3)
ABSOLUTE LYMPH #: 1.84 K/UL (ref 1.1–3.7)
ABSOLUTE MONO #: 0.68 K/UL (ref 0.1–1.2)
ANION GAP SERPL CALCULATED.3IONS-SCNC: 11 MMOL/L (ref 9–17)
ANTIBODY SCREEN: NEGATIVE
ARM BAND NUMBER: NORMAL
BASOPHILS # BLD: 1 % (ref 0–2)
BASOPHILS ABSOLUTE: 0.08 K/UL (ref 0–0.2)
BILIRUBIN URINE: NEGATIVE
BUN BLDV-MCNC: 15 MG/DL (ref 8–23)
BUN/CREAT BLD: 19 (ref 9–20)
CALCIUM SERPL-MCNC: 10.1 MG/DL (ref 8.6–10.4)
CHLORIDE BLD-SCNC: 102 MMOL/L (ref 98–107)
CO2: 28 MMOL/L (ref 20–31)
COLOR: YELLOW
COMMENT UA: ABNORMAL
CREAT SERPL-MCNC: 0.78 MG/DL (ref 0.5–0.9)
EOSINOPHILS RELATIVE PERCENT: 1 % (ref 1–4)
EXPIRATION DATE: NORMAL
GFR SERPL CREATININE-BSD FRML MDRD: >60 ML/MIN/1.73M2
GLUCOSE BLD-MCNC: 131 MG/DL (ref 70–99)
GLUCOSE URINE: ABNORMAL
HCT VFR BLD CALC: 50.7 % (ref 36.3–47.1)
HEMOGLOBIN: 15.4 G/DL (ref 11.9–15.1)
IMMATURE GRANULOCYTES: 1 %
INR BLD: 0.9
KETONES, URINE: NEGATIVE
LEUKOCYTE ESTERASE, URINE: NEGATIVE
LYMPHOCYTES # BLD: 23 % (ref 24–43)
MCH RBC QN AUTO: 28.3 PG (ref 25.2–33.5)
MCHC RBC AUTO-ENTMCNC: 30.4 G/DL (ref 28.4–34.8)
MCV RBC AUTO: 93 FL (ref 82.6–102.9)
MONOCYTES # BLD: 8 % (ref 3–12)
NITRITE, URINE: NEGATIVE
NRBC AUTOMATED: 0 PER 100 WBC
PARTIAL THROMBOPLASTIN TIME: 30.9 SEC (ref 23.9–33.8)
PDW BLD-RTO: 14.9 % (ref 11.8–14.4)
PH UA: 5.5 (ref 5–8)
PLATELET # BLD: 425 K/UL (ref 138–453)
PMV BLD AUTO: 9.5 FL (ref 8.1–13.5)
POTASSIUM SERPL-SCNC: 3.4 MMOL/L (ref 3.7–5.3)
PROTEIN UA: NEGATIVE
PROTHROMBIN TIME: 12.4 SEC (ref 11.5–14.2)
RBC # BLD: 5.45 M/UL (ref 3.95–5.11)
RBC # BLD: ABNORMAL 10*6/UL
SEG NEUTROPHILS: 66 % (ref 36–65)
SEGMENTED NEUTROPHILS ABSOLUTE COUNT: 5.38 K/UL (ref 1.5–8.1)
SODIUM BLD-SCNC: 141 MMOL/L (ref 135–144)
SPECIFIC GRAVITY UA: 1.01 (ref 1–1.03)
TURBIDITY: CLEAR
URINE HGB: NEGATIVE
UROBILINOGEN, URINE: NORMAL
WBC # BLD: 8.1 K/UL (ref 3.5–11.3)

## 2022-11-07 PROCEDURE — 86850 RBC ANTIBODY SCREEN: CPT

## 2022-11-07 PROCEDURE — 86900 BLOOD TYPING SEROLOGIC ABO: CPT

## 2022-11-07 PROCEDURE — 85610 PROTHROMBIN TIME: CPT

## 2022-11-07 PROCEDURE — 85025 COMPLETE CBC W/AUTO DIFF WBC: CPT

## 2022-11-07 PROCEDURE — 36415 COLL VENOUS BLD VENIPUNCTURE: CPT

## 2022-11-07 PROCEDURE — 85730 THROMBOPLASTIN TIME PARTIAL: CPT

## 2022-11-07 PROCEDURE — 87086 URINE CULTURE/COLONY COUNT: CPT

## 2022-11-07 PROCEDURE — 81003 URINALYSIS AUTO W/O SCOPE: CPT

## 2022-11-07 PROCEDURE — 86901 BLOOD TYPING SEROLOGIC RH(D): CPT

## 2022-11-07 PROCEDURE — 80048 BASIC METABOLIC PNL TOTAL CA: CPT

## 2022-11-07 RX ORDER — CLINDAMYCIN PHOSPHATE 900 MG/50ML
900 INJECTION INTRAVENOUS ONCE
Status: CANCELLED | OUTPATIENT
Start: 2022-11-17

## 2022-11-07 NOTE — PRE-PROCEDURE INSTRUCTIONS
[facility] Thursday[facility] 17,[facility] at 07:30 AM    Once you enter the hospital lobby, take the elevators to the second floor. Check-In is at the surgery registration desk. Continue to take your home medications as you normally do up to and including the night before surgery with the exception of any blood thinning medications. Please stop any blood thinning medications as directed by your surgeon or prescribing physician. Failure to stop certain medications may interfere with your scheduled surgery. These may include:  Aspirin, Warfarin (Coumadin), Clopidogrel (Plavix), Ibuprofen (Motrin, Advil), Naproxen (Aleve), Meloxicam (Mobic), Celecoxib (Celebrex), Eliquis, Pradaxa, Xarelto, Effient, Fish Oil, Herbal supplements. Please take the following medication(s) the day of surgery with a small sip of water:  amlodipine      PREPARING FOR YOUR SURGERY:     Before surgery, you can play an important role in your own health. Because skin is not sterile, we need to be sure that your skin is as free of germs as possible before surgery by carefully washing before surgery. Preparing or prepping skin before surgery can reduce the risk of a surgical site infection.   Do not shave the area of your body where your surgery will be performed unless you received specific permission from your physician. You will need to shower at home the night before surgery and the morning of surgery with a special soap called chlorhexidine gluconate (CHG*). *Not to be used by people allergic to Chlorhexidine Gluconate (CHG). Following these instructions will help you be sure that your skin is clean before surgery. Instructions on cleaning your skin before surgery: The night before your surgery: You will need to shower with warm water (not hot) and the CHG soap. Use a clean wash cloth and a clean towel. Have clean clothes available to put on after the shower.       First wash your hair with regular shampoo. Rinse your hair and body thoroughly to remove the shampoo. Wash your face and genital area (private parts) with your regular soap or water only. Thoroughly rinse your body with warm water from the neck down. Turn water off to prevent rinsing the soap off too soon. With a clean wet washcloth and half of the CHG soap in the bottle, lather your entire body from the neck down. Do not use CHG soap near your eyes or ears to avoid injury to those areas. Wash thoroughly, paying special attention to the area where your surgery will be performed. Wash your body gently for five (5) minutes. Avoid scrubbing your skin too hard. Turn the water back on and rinse your body thoroughly. Pat yourself dry with a clean, soft towel. Do not apply lotion, cream or powder. Dress with clean freshly washed clothes. The morning of surgery:    Repeat shower following steps above - using remaining half of CHG soap in bottle. Patient Instructions: If you are having any type of anesthesia you are to have nothing to eat or drink after midnight the night before your surgery. This includes gum, hard candy, mints, water or smoking or chewing tobacco.  The only exception to this is a small sip of water to take with any morning dose of heart, blood pressure, or seizure medications. No alcoholic beverages for 24 hours prior to surgery. Brush your teeth but do not swallow water. Bring your eyeglasses and case with you. No contacts are to be worn the day of surgery. You also may bring your hearing aids. Most surgical procedures involving anesthesia will require that you remove your dentures prior to surgery. Do not wear any jewelry or body piercings day of surgery. Also, NO lotion, perfume or deodorant to be used the day of surgery.   No nail polish on the operative extremity (arm/leg surgeries)    If you are staying overnight with us, please bring a small bag of necessary personal items. Please wear loose, comfortable clothing. If you are potentially going to have a cast or brace bring clothing that will fit over them. In case of illness - If you have cold or flu like symptoms (high fever, runny nose, sore throat, cough, etc.) rash, nausea, vomiting, loose stools, and/or recent contact with someone who has a contagious disease (chicken pox, measles, etc.) Please call your doctor before coming to the hospital.         Day of Surgery/Procedure:    As a patient at [place] [address] you can expect quality medical and nursing care that is centered on your individual needs. Our goal is to make your surgical experience as comfortable as possible    . Transportation After Your Surgery/Procedure: You will need a friend or family member to drive you home after your procedure. Your  must be 25years of age or older and able to sign off on your discharge instructions. A taxi cab or any other form of public transportation is not acceptable. Your friend or family member must stay at the hospital throughout your procedure. Someone must remain with you for the first 24 hours after your surgery if you receive anesthesia or medication. If you do not have someone to stay with you, your procedure may be cancelled.       If you have any other questions regarding your procedure or the day of surgery, please call [phone]      _________________________  ____________________________  Signature (Patient)              Signature (Provider) & date

## 2022-11-08 LAB
CULTURE: NO GROWTH
SPECIMEN DESCRIPTION: NORMAL

## 2022-11-16 ENCOUNTER — ANESTHESIA EVENT (OUTPATIENT)
Dept: OPERATING ROOM | Age: 87
DRG: 039 | End: 2022-11-16
Payer: MEDICARE

## 2022-11-17 ENCOUNTER — ANESTHESIA (OUTPATIENT)
Dept: OPERATING ROOM | Age: 87
DRG: 039 | End: 2022-11-17
Payer: MEDICARE

## 2022-11-17 ENCOUNTER — APPOINTMENT (OUTPATIENT)
Dept: GENERAL RADIOLOGY | Age: 87
DRG: 039 | End: 2022-11-17
Attending: SURGERY
Payer: MEDICARE

## 2022-11-17 ENCOUNTER — HOSPITAL ENCOUNTER (INPATIENT)
Age: 87
LOS: 1 days | Discharge: HOME | DRG: 039 | End: 2022-11-18
Attending: SURGERY | Admitting: SURGERY
Payer: MEDICARE

## 2022-11-17 DIAGNOSIS — I65.22 STENOSIS OF LEFT CAROTID ARTERY: ICD-10-CM

## 2022-11-17 PROCEDURE — 2000000000 HC ICU R&B

## 2022-11-17 PROCEDURE — 2500000003 HC RX 250 WO HCPCS: Performed by: SURGERY

## 2022-11-17 PROCEDURE — 88304 TISSUE EXAM BY PATHOLOGIST: CPT

## 2022-11-17 PROCEDURE — 7100000000 HC PACU RECOVERY - FIRST 15 MIN: Performed by: SURGERY

## 2022-11-17 PROCEDURE — 6360000002 HC RX W HCPCS: Performed by: ANESTHESIOLOGY

## 2022-11-17 PROCEDURE — 6360000002 HC RX W HCPCS: Performed by: NURSE ANESTHETIST, CERTIFIED REGISTERED

## 2022-11-17 PROCEDURE — 3600000002 HC SURGERY LEVEL 2 BASE: Performed by: SURGERY

## 2022-11-17 PROCEDURE — C1889 IMPLANT/INSERT DEVICE, NOC: HCPCS | Performed by: SURGERY

## 2022-11-17 PROCEDURE — 3700000001 HC ADD 15 MINUTES (ANESTHESIA): Performed by: SURGERY

## 2022-11-17 PROCEDURE — 7100000001 HC PACU RECOVERY - ADDTL 15 MIN: Performed by: SURGERY

## 2022-11-17 PROCEDURE — 2709999900 HC NON-CHARGEABLE SUPPLY: Performed by: SURGERY

## 2022-11-17 PROCEDURE — A4217 STERILE WATER/SALINE, 500 ML: HCPCS | Performed by: SURGERY

## 2022-11-17 PROCEDURE — 03SL0ZZ REPOSITION LEFT INTERNAL CAROTID ARTERY, OPEN APPROACH: ICD-10-PCS | Performed by: SURGERY

## 2022-11-17 PROCEDURE — 03CL0ZZ EXTIRPATION OF MATTER FROM LEFT INTERNAL CAROTID ARTERY, OPEN APPROACH: ICD-10-PCS | Performed by: SURGERY

## 2022-11-17 PROCEDURE — 3700000000 HC ANESTHESIA ATTENDED CARE: Performed by: SURGERY

## 2022-11-17 PROCEDURE — 6370000000 HC RX 637 (ALT 250 FOR IP): Performed by: SURGERY

## 2022-11-17 PROCEDURE — 2580000003 HC RX 258: Performed by: SURGERY

## 2022-11-17 PROCEDURE — 2500000003 HC RX 250 WO HCPCS: Performed by: NURSE ANESTHETIST, CERTIFIED REGISTERED

## 2022-11-17 PROCEDURE — 2580000003 HC RX 258: Performed by: ANESTHESIOLOGY

## 2022-11-17 PROCEDURE — 3600000012 HC SURGERY LEVEL 2 ADDTL 15MIN: Performed by: SURGERY

## 2022-11-17 PROCEDURE — 71046 X-RAY EXAM CHEST 2 VIEWS: CPT

## 2022-11-17 PROCEDURE — 2580000003 HC RX 258: Performed by: NURSE ANESTHETIST, CERTIFIED REGISTERED

## 2022-11-17 PROCEDURE — 6360000002 HC RX W HCPCS: Performed by: SURGERY

## 2022-11-17 RX ORDER — SODIUM CHLORIDE 0.9 % (FLUSH) 0.9 %
5-40 SYRINGE (ML) INJECTION PRN
Status: DISCONTINUED | OUTPATIENT
Start: 2022-11-17 | End: 2022-11-17 | Stop reason: HOSPADM

## 2022-11-17 RX ORDER — PROPOFOL 10 MG/ML
INJECTION, EMULSION INTRAVENOUS PRN
Status: DISCONTINUED | OUTPATIENT
Start: 2022-11-17 | End: 2022-11-17 | Stop reason: SDUPTHER

## 2022-11-17 RX ORDER — MORPHINE SULFATE 4 MG/ML
4 INJECTION, SOLUTION INTRAMUSCULAR; INTRAVENOUS
Status: DISCONTINUED | OUTPATIENT
Start: 2022-11-17 | End: 2022-11-18 | Stop reason: HOSPADM

## 2022-11-17 RX ORDER — SODIUM CHLORIDE, SODIUM LACTATE, POTASSIUM CHLORIDE, CALCIUM CHLORIDE 600; 310; 30; 20 MG/100ML; MG/100ML; MG/100ML; MG/100ML
INJECTION, SOLUTION INTRAVENOUS CONTINUOUS
Status: DISCONTINUED | OUTPATIENT
Start: 2022-11-17 | End: 2022-11-18 | Stop reason: HOSPADM

## 2022-11-17 RX ORDER — LABETALOL HYDROCHLORIDE 5 MG/ML
INJECTION, SOLUTION INTRAVENOUS PRN
Status: DISCONTINUED | OUTPATIENT
Start: 2022-11-17 | End: 2022-11-17 | Stop reason: SDUPTHER

## 2022-11-17 RX ORDER — OXYCODONE HYDROCHLORIDE 5 MG/1
5 TABLET ORAL
Status: DISCONTINUED | OUTPATIENT
Start: 2022-11-17 | End: 2022-11-17 | Stop reason: HOSPADM

## 2022-11-17 RX ORDER — PROTAMINE SULFATE 10 MG/ML
INJECTION, SOLUTION INTRAVENOUS PRN
Status: DISCONTINUED | OUTPATIENT
Start: 2022-11-17 | End: 2022-11-17 | Stop reason: SDUPTHER

## 2022-11-17 RX ORDER — MORPHINE SULFATE 2 MG/ML
2 INJECTION, SOLUTION INTRAMUSCULAR; INTRAVENOUS
Status: DISCONTINUED | OUTPATIENT
Start: 2022-11-17 | End: 2022-11-18 | Stop reason: HOSPADM

## 2022-11-17 RX ORDER — ASPIRIN 81 MG/1
81 TABLET ORAL DAILY
Status: DISCONTINUED | OUTPATIENT
Start: 2022-11-17 | End: 2022-11-18 | Stop reason: HOSPADM

## 2022-11-17 RX ORDER — CLINDAMYCIN PHOSPHATE 900 MG/50ML
900 INJECTION INTRAVENOUS EVERY 8 HOURS
Status: COMPLETED | OUTPATIENT
Start: 2022-11-17 | End: 2022-11-18

## 2022-11-17 RX ORDER — ATORVASTATIN CALCIUM 20 MG/1
20 TABLET, FILM COATED ORAL NIGHTLY
Status: DISCONTINUED | OUTPATIENT
Start: 2022-11-18 | End: 2022-11-17 | Stop reason: SDUPTHER

## 2022-11-17 RX ORDER — LIDOCAINE HYDROCHLORIDE 20 MG/ML
INJECTION, SOLUTION EPIDURAL; INFILTRATION; INTRACAUDAL; PERINEURAL PRN
Status: DISCONTINUED | OUTPATIENT
Start: 2022-11-17 | End: 2022-11-17 | Stop reason: SDUPTHER

## 2022-11-17 RX ORDER — HEPARIN SODIUM 1000 [USP'U]/ML
INJECTION, SOLUTION INTRAVENOUS; SUBCUTANEOUS PRN
Status: DISCONTINUED | OUTPATIENT
Start: 2022-11-17 | End: 2022-11-17 | Stop reason: SDUPTHER

## 2022-11-17 RX ORDER — ATORVASTATIN CALCIUM 20 MG/1
20 TABLET, FILM COATED ORAL DAILY
COMMUNITY

## 2022-11-17 RX ORDER — SODIUM CHLORIDE 9 MG/ML
INJECTION, SOLUTION INTRAVENOUS PRN
Status: DISCONTINUED | OUTPATIENT
Start: 2022-11-17 | End: 2022-11-18 | Stop reason: HOSPADM

## 2022-11-17 RX ORDER — ONDANSETRON 2 MG/ML
4 INJECTION INTRAMUSCULAR; INTRAVENOUS
Status: DISCONTINUED | OUTPATIENT
Start: 2022-11-17 | End: 2022-11-17 | Stop reason: HOSPADM

## 2022-11-17 RX ORDER — ATORVASTATIN CALCIUM 20 MG/1
20 TABLET, FILM COATED ORAL NIGHTLY
Status: DISCONTINUED | OUTPATIENT
Start: 2022-11-17 | End: 2022-11-18 | Stop reason: HOSPADM

## 2022-11-17 RX ORDER — SODIUM CHLORIDE 9 MG/ML
INJECTION, SOLUTION INTRAVENOUS PRN
Status: DISCONTINUED | OUTPATIENT
Start: 2022-11-17 | End: 2022-11-17 | Stop reason: HOSPADM

## 2022-11-17 RX ORDER — SODIUM CHLORIDE 0.9 % (FLUSH) 0.9 %
5-40 SYRINGE (ML) INJECTION EVERY 12 HOURS SCHEDULED
Status: DISCONTINUED | OUTPATIENT
Start: 2022-11-17 | End: 2022-11-17 | Stop reason: HOSPADM

## 2022-11-17 RX ORDER — GLYCOPYRROLATE 0.2 MG/ML
INJECTION INTRAMUSCULAR; INTRAVENOUS PRN
Status: DISCONTINUED | OUTPATIENT
Start: 2022-11-17 | End: 2022-11-17 | Stop reason: SDUPTHER

## 2022-11-17 RX ORDER — HYDROMORPHONE HYDROCHLORIDE 1 MG/ML
0.5 INJECTION, SOLUTION INTRAMUSCULAR; INTRAVENOUS; SUBCUTANEOUS EVERY 5 MIN PRN
Status: DISCONTINUED | OUTPATIENT
Start: 2022-11-17 | End: 2022-11-17 | Stop reason: HOSPADM

## 2022-11-17 RX ORDER — M-VIT,TX,IRON,MINS/CALC/FOLIC 27MG-0.4MG
1 TABLET ORAL DAILY
Status: DISCONTINUED | OUTPATIENT
Start: 2022-11-17 | End: 2022-11-18 | Stop reason: HOSPADM

## 2022-11-17 RX ORDER — FENTANYL CITRATE 50 UG/ML
INJECTION, SOLUTION INTRAMUSCULAR; INTRAVENOUS PRN
Status: DISCONTINUED | OUTPATIENT
Start: 2022-11-17 | End: 2022-11-17 | Stop reason: SDUPTHER

## 2022-11-17 RX ORDER — ASPIRIN 81 MG/1
81 TABLET ORAL DAILY
Status: DISCONTINUED | OUTPATIENT
Start: 2022-11-17 | End: 2022-11-17 | Stop reason: SDUPTHER

## 2022-11-17 RX ORDER — SODIUM CHLORIDE 0.9 % (FLUSH) 0.9 %
5-40 SYRINGE (ML) INJECTION EVERY 12 HOURS SCHEDULED
Status: DISCONTINUED | OUTPATIENT
Start: 2022-11-17 | End: 2022-11-18 | Stop reason: HOSPADM

## 2022-11-17 RX ORDER — ROCURONIUM BROMIDE 10 MG/ML
INJECTION, SOLUTION INTRAVENOUS PRN
Status: DISCONTINUED | OUTPATIENT
Start: 2022-11-17 | End: 2022-11-17 | Stop reason: SDUPTHER

## 2022-11-17 RX ORDER — DEXAMETHASONE SODIUM PHOSPHATE 10 MG/ML
INJECTION, SOLUTION INTRAMUSCULAR; INTRAVENOUS PRN
Status: DISCONTINUED | OUTPATIENT
Start: 2022-11-17 | End: 2022-11-17 | Stop reason: SDUPTHER

## 2022-11-17 RX ORDER — SODIUM CHLORIDE, SODIUM LACTATE, POTASSIUM CHLORIDE, CALCIUM CHLORIDE 600; 310; 30; 20 MG/100ML; MG/100ML; MG/100ML; MG/100ML
INJECTION, SOLUTION INTRAVENOUS CONTINUOUS
Status: DISCONTINUED | OUTPATIENT
Start: 2022-11-18 | End: 2022-11-17

## 2022-11-17 RX ORDER — SODIUM CHLORIDE 9 MG/ML
INJECTION, SOLUTION INTRAVENOUS CONTINUOUS PRN
Status: DISCONTINUED | OUTPATIENT
Start: 2022-11-17 | End: 2022-11-17 | Stop reason: SDUPTHER

## 2022-11-17 RX ORDER — ONDANSETRON 2 MG/ML
INJECTION INTRAMUSCULAR; INTRAVENOUS PRN
Status: DISCONTINUED | OUTPATIENT
Start: 2022-11-17 | End: 2022-11-17 | Stop reason: SDUPTHER

## 2022-11-17 RX ORDER — CLINDAMYCIN PHOSPHATE 900 MG/50ML
900 INJECTION INTRAVENOUS ONCE
Status: COMPLETED | OUTPATIENT
Start: 2022-11-17 | End: 2022-11-17

## 2022-11-17 RX ORDER — SODIUM CHLORIDE 0.9 % (FLUSH) 0.9 %
5-40 SYRINGE (ML) INJECTION PRN
Status: DISCONTINUED | OUTPATIENT
Start: 2022-11-17 | End: 2022-11-18 | Stop reason: HOSPADM

## 2022-11-17 RX ORDER — FENTANYL CITRATE 50 UG/ML
25 INJECTION, SOLUTION INTRAMUSCULAR; INTRAVENOUS EVERY 5 MIN PRN
Status: DISCONTINUED | OUTPATIENT
Start: 2022-11-17 | End: 2022-11-17 | Stop reason: HOSPADM

## 2022-11-17 RX ORDER — LIDOCAINE HYDROCHLORIDE 10 MG/ML
1 INJECTION, SOLUTION EPIDURAL; INFILTRATION; INTRACAUDAL; PERINEURAL
Status: DISCONTINUED | OUTPATIENT
Start: 2022-11-18 | End: 2022-11-17 | Stop reason: HOSPADM

## 2022-11-17 RX ADMIN — PHENYLEPHRINE HYDROCHLORIDE 50 MCG: 10 INJECTION INTRAVENOUS at 10:56

## 2022-11-17 RX ADMIN — PHENYLEPHRINE HYDROCHLORIDE 100 MCG: 10 INJECTION INTRAVENOUS at 10:47

## 2022-11-17 RX ADMIN — PROPOFOL 120 MG: 10 INJECTION, EMULSION INTRAVENOUS at 09:59

## 2022-11-17 RX ADMIN — LABETALOL HYDROCHLORIDE 10 MG: 5 INJECTION INTRAVENOUS at 12:00

## 2022-11-17 RX ADMIN — SUGAMMADEX 100 MG: 100 INJECTION, SOLUTION INTRAVENOUS at 11:50

## 2022-11-17 RX ADMIN — PHENYLEPHRINE HYDROCHLORIDE 50 MCG: 10 INJECTION INTRAVENOUS at 11:49

## 2022-11-17 RX ADMIN — PHENYLEPHRINE HYDROCHLORIDE 50 MCG: 10 INJECTION INTRAVENOUS at 10:15

## 2022-11-17 RX ADMIN — Medication 25 MCG: at 10:15

## 2022-11-17 RX ADMIN — PHENYLEPHRINE HYDROCHLORIDE 100 MCG: 10 INJECTION INTRAVENOUS at 11:00

## 2022-11-17 RX ADMIN — Medication 25 MCG: at 10:44

## 2022-11-17 RX ADMIN — PROPOFOL 50 MG: 10 INJECTION, EMULSION INTRAVENOUS at 11:39

## 2022-11-17 RX ADMIN — SODIUM CHLORIDE: 9 INJECTION, SOLUTION INTRAVENOUS at 09:59

## 2022-11-17 RX ADMIN — PHENYLEPHRINE HYDROCHLORIDE 40 MCG/MIN: 10 INJECTION INTRAVENOUS at 10:16

## 2022-11-17 RX ADMIN — CLINDAMYCIN IN 5 PERCENT DEXTROSE 900 MG: 18 INJECTION, SOLUTION INTRAVENOUS at 10:09

## 2022-11-17 RX ADMIN — PHENYLEPHRINE HYDROCHLORIDE 50 MCG: 10 INJECTION INTRAVENOUS at 11:47

## 2022-11-17 RX ADMIN — Medication 100 MCG: at 09:59

## 2022-11-17 RX ADMIN — PHENYLEPHRINE HYDROCHLORIDE 100 MCG: 10 INJECTION INTRAVENOUS at 10:59

## 2022-11-17 RX ADMIN — GLYCOPYRROLATE 0.2 MG: 0.2 INJECTION INTRAMUSCULAR; INTRAVENOUS at 10:26

## 2022-11-17 RX ADMIN — SODIUM CHLORIDE, POTASSIUM CHLORIDE, SODIUM LACTATE AND CALCIUM CHLORIDE: 600; 310; 30; 20 INJECTION, SOLUTION INTRAVENOUS at 08:00

## 2022-11-17 RX ADMIN — ONDANSETRON 4 MG: 2 INJECTION INTRAMUSCULAR; INTRAVENOUS at 11:30

## 2022-11-17 RX ADMIN — PROPOFOL 50 MG: 10 INJECTION, EMULSION INTRAVENOUS at 10:12

## 2022-11-17 RX ADMIN — Medication 25 MCG: at 12:42

## 2022-11-17 RX ADMIN — DEXAMETHASONE SODIUM PHOSPHATE 10 MG: 10 INJECTION, SOLUTION INTRAMUSCULAR; INTRAVENOUS at 10:07

## 2022-11-17 RX ADMIN — PHENYLEPHRINE HYDROCHLORIDE 100 MCG: 10 INJECTION INTRAVENOUS at 11:16

## 2022-11-17 RX ADMIN — PHENYLEPHRINE HYDROCHLORIDE 50 MCG: 10 INJECTION INTRAVENOUS at 10:37

## 2022-11-17 RX ADMIN — SODIUM CHLORIDE, PRESERVATIVE FREE 10 ML: 5 INJECTION INTRAVENOUS at 20:55

## 2022-11-17 RX ADMIN — HEPARIN SODIUM 5000 UNITS: 1000 INJECTION INTRAVENOUS; SUBCUTANEOUS at 10:44

## 2022-11-17 RX ADMIN — PROTAMINE SULFATE 15 MG: 10 INJECTION, SOLUTION INTRAVENOUS at 11:32

## 2022-11-17 RX ADMIN — PHENYLEPHRINE HYDROCHLORIDE 50 MCG: 10 INJECTION INTRAVENOUS at 10:33

## 2022-11-17 RX ADMIN — LIDOCAINE HYDROCHLORIDE 50 MG: 20 INJECTION, SOLUTION EPIDURAL; INFILTRATION; INTRACAUDAL; PERINEURAL at 09:59

## 2022-11-17 RX ADMIN — PROPOFOL 20 MG: 10 INJECTION, EMULSION INTRAVENOUS at 10:44

## 2022-11-17 RX ADMIN — Medication 25 MCG: at 12:29

## 2022-11-17 RX ADMIN — PROPOFOL 50 MG: 10 INJECTION, EMULSION INTRAVENOUS at 11:42

## 2022-11-17 RX ADMIN — PHENYLEPHRINE HYDROCHLORIDE 50 MCG: 10 INJECTION INTRAVENOUS at 11:15

## 2022-11-17 RX ADMIN — SODIUM CHLORIDE, POTASSIUM CHLORIDE, SODIUM LACTATE AND CALCIUM CHLORIDE: 600; 310; 30; 20 INJECTION, SOLUTION INTRAVENOUS at 19:13

## 2022-11-17 RX ADMIN — ATORVASTATIN CALCIUM 20 MG: 20 TABLET, FILM COATED ORAL at 20:55

## 2022-11-17 RX ADMIN — CLINDAMYCIN IN 5 PERCENT DEXTROSE 900 MG: 18 INJECTION, SOLUTION INTRAVENOUS at 20:49

## 2022-11-17 RX ADMIN — ROCURONIUM BROMIDE 50 MG: 10 INJECTION, SOLUTION INTRAVENOUS at 09:59

## 2022-11-17 ASSESSMENT — PAIN SCALES - GENERAL
PAINLEVEL_OUTOF10: 0
PAINLEVEL_OUTOF10: 5
PAINLEVEL_OUTOF10: 5

## 2022-11-17 ASSESSMENT — PAIN DESCRIPTION - ORIENTATION: ORIENTATION: LEFT

## 2022-11-17 ASSESSMENT — PAIN DESCRIPTION - LOCATION: LOCATION: NECK

## 2022-11-17 NOTE — H&P
Interval H&P Note    Pt Name: Mario Chand  MRN: [id number]  YOB: 1935  Date of evaluation: 11/17/2022      [x] I have reviewed the Hardcopy Vascular Note by Dr Saeed Garza dated 10/24/22 and the PCP Note by Dr Maxi Mcclure dated 1/2/22 for medical clearance both labeled in the short chart for the Interval History and Physical note. [x] I have examined  Mario Chand  There are no changes to the patient who is scheduled for LEFT CAROTID ENDARTERECTOMY by Neptali Martin MD FOR STENOSIS OF LEFT CAROTID ARTERY [I65.22]. The patient denies new health changes, fever, chills, wheezing, cough, increased SOB, chest pain, open sores or wounds. Preoperative Medical Clearance by Dr Maxi Mcclure dated 11/2/22 considered \"low to moderate risk\" with cardiology clearance. Patient had Cardiac clearance by Dr Princess Álvarez on 11/10/22. Patient had both a ECHO 11/10/22 and Lexiscan stress test done 11/11/22(both hard copies in chart). She denies increased SOB, CRENSHAW, palpitations, dizziness. lightheadedness, syncope, unilateral extremity  weakness, speech or vision changes.  Last ASA 81mg 11/15/22    LEXISCAN 11/11/22   Impression:  Bethanne Corns stress test is electrocardiographically negative for ischemia   Nuclear imaging follows    Cardiolite myocardial perfusion   Impression:   Myocardial perfusion is normal  No definite evidence of ischemia  Normal gated SPECT  EKG portion negative for ischemia  Stress myocardial imaging low risk for ischemia      Past Medical History:     Past Medical History:   Diagnosis Date    Bilateral carotid artery disease (HCC)     Elevated hemoglobin (HCC)     Hx of basal cell carcinoma     Hyperlipidemia     Hypertension     Low blood sugar     Mild depression     Thyroid nodule         Past Surgical History:     Past Surgical History:   Procedure Laterality Date    APPENDECTOMY      BREAST SURGERY      I&D breast abscess age 8    CHOLECYSTECTOMY      COLONOSCOPY N/A 08/09/2021     LEFT SHOULDER LESION BIOPSY EXCISION X 3 (Left ) RIGHT BACK LESION BIOPSY EXCISION X2 (RIGHT) HEMORROIDECTOMY(N/A) COLORECTAL CANCER SCREENING NOT HIGH RISK    COLONOSCOPY N/A 8/9/2021    COLONOSCOPY WITH BIOPSY performed by Luh Segovia DO at [address] N/A 8/9/2021    HEMORRHOIDECTOMY performed by Luh Segovia DO at [address] [facility] [address])      SHOULDER SURGERY Left 8/9/2021    LEFT SHOULDER LESION BIOPSY EXCISION X 3 performed by Luh Segovia DO at [address] Right 8/9/2021    RIGHT BACK LESION BIOPSY EXCISION X 2 performed by Luh Segovia DO at [address] History:     Tobacco:    reports that she has quit smoking. She has never used smokeless tobacco.  Alcohol:      reports current alcohol use. Drug Use:  has no history on file for drug use. Family History:     Family History   Problem Relation Age of Onset    Hypertension Father         Vital signs: BP (!) 146/52   Pulse 67   Temp (!) 96.6 °F (35.9 °C) (Temporal)   Resp 16   Ht 5' 1\" (1.549 m)   Wt 104 lb 15 oz (47.6 kg)   SpO2 97%   BMI 19.83 kg/m²     Allergies:  Penicillins, Monosodium glutamate, Morphine, and Lisinopril    Medications:    Prior to Admission medications    Medication Sig Start Date End Date Taking? Authorizing Provider   atorvastatin (LIPITOR) 20 MG tablet Take 20 mg by mouth daily   Yes Historical Provider, MD   ibuprofen (ADVIL;MOTRIN) 600 MG tablet Take 1 tablet by mouth 4 times daily as needed for Pain  Patient not taking: Reported on 11/17/2022 8/9/21   Luh Segovia DO   Multiple Vitamins-Minerals (THERAPEUTIC MULTIVITAMIN-MINERALS) tablet Take 1 tablet by mouth daily    Historical Provider, MD   amLODIPine (NORVASC) 5 MG tablet Take 7.5 mg by mouth daily    Historical Provider, MD   aspirin 81 MG EC tablet Take 81 mg by mouth daily    Historical Provider, MD         This is a 82 y.o. female who is pleasant, cooperative, alert and oriented x3, in no acute distress. Heart: Heart sounds are normal.  HR 67 regular rate and rhythm without murmur, gallop or rub. Lungs: Normal respiratory effort with equal expansion, good air exchange, unlabored and clear to auscultation without wheezes or rales bilaterally   Abdomen: soft, nontender, nondistended with bowel sounds   Neuro: cranial nerves II-XII grossly intact . Labs:  Recent Labs     11/07/22  1046   HGB 15.4*   HCT 50.7*   WBC 8.1   MCV 93.0         K 3.4*      CO2 28   BUN 15   CREATININE 0.78   GLUCOSE 131*   INR 0.9   PROTIME 12.4   APTT 30.9       No results for input(s): COVID19 in the last 720 hours.     KATTY Middleton CNP  Electronically signed 11/17/2022 at 8:08 AM

## 2022-11-17 NOTE — PROGRESS NOTES
[facility] Pharmacy Service   Medication Review    The patient's list of current home medications has been reviewed. Patient refused to review medications with me when visited her room in CVICU on 11/17/22. Source(s) of information: [address] PCP note (Dr. Crissy Chester)    Based on information provided by the above source(s), I have updated the patient's home med list as described below. Please review the ACTION REQUESTED section of this note for any discrepancies on current hospital orders. I changed or updated the following medications on the patient's home medication list:  Discontinued Ibuprofen 600 mg (Old one-time RX written in 2021, pt also told RN per home med list not that she is not taking)          PROVIDER ACTION REQUESTED  Medications that need to be addressed by a physician/nurse practitioner:    Medication Action Requested          None         Please feel free to call me with any questions about this encounter. Thank you. This note will be reviewed and co-signed by the [facility] Pharmacist.    Kassi Arora PharmD student  [facility] Pharmacy Service  Phone:  [phone]  Fax: [phone]      Electronically signed by Kassi Arora on 11/17/2022 at 4:30 PM       Prior to Admission medications    Medication Sig Start Date End Date Taking?  Authorizing Provider   atorvastatin (LIPITOR) 20 MG tablet Take 20 mg by mouth daily   Yes Historical Provider, MD   Multiple Vitamins-Minerals (THERAPEUTIC MULTIVITAMIN-MINERALS) tablet Take 1 tablet by mouth daily    Historical Provider, MD   amLODIPine (NORVASC) 5 MG tablet Take 7.5 mg by mouth daily    Historical Provider, MD   aspirin 81 MG EC tablet Take 81 mg by mouth daily    Historical Provider, MD

## 2022-11-17 NOTE — ANESTHESIA PRE PROCEDURE
Department of Anesthesiology  Preprocedure Note       Name:  Durward Lesches   Age:  82 y.o.  :  1935                                          MRN:  [id number]         Date:  2022      Surgeon: Liam España):  Bob García MD    Procedure: Procedure(s):  LEFT CAROTID ENDARTERECTOMY    Medications prior to admission:   Prior to Admission medications    Medication Sig Start Date End Date Taking? Authorizing Provider   atorvastatin (LIPITOR) 20 MG tablet Take 20 mg by mouth daily   Yes Historical Provider, MD   ibuprofen (ADVIL;MOTRIN) 600 MG tablet Take 1 tablet by mouth 4 times daily as needed for Pain  Patient not taking: Reported on 2022   Etelvina El, DO   Multiple Vitamins-Minerals (THERAPEUTIC MULTIVITAMIN-MINERALS) tablet Take 1 tablet by mouth daily    Historical Provider, MD   amLODIPine (NORVASC) 5 MG tablet Take 7.5 mg by mouth daily    Historical Provider, MD   aspirin 81 MG EC tablet Take 81 mg by mouth daily    Historical Provider, MD       Current medications:    Current Facility-Administered Medications   Medication Dose Route Frequency Provider Last Rate Last Admin    [START ON 2022] lidocaine PF 1 % injection 1 mL  1 mL IntraDERmal Once PRN Dolores Wynn MD       Adriana Orn Ginger Destinee ON 2022] lactated ringers infusion   IntraVENous Continuous Dolores Wynn MD 50 mL/hr at 22 0800 New Bag at 22 0800    sodium chloride flush 0.9 % injection 5-40 mL  5-40 mL IntraVENous 2 times per day Dolores Wynn MD        sodium chloride flush 0.9 % injection 5-40 mL  5-40 mL IntraVENous PRN Dolores Wynn MD        0.9 % sodium chloride infusion   IntraVENous PRN Dolores Wynn MD        clindamycin (CLEOCIN) 900 mg in dextrose 5 % 50 mL IVPB  900 mg IntraVENous Once Bob García MD           Allergies:     Allergies   Allergen Reactions    Penicillins Shortness Of Breath     severe    Monosodium Glutamate Swelling     Heart races    Morphine Itching     Reacted to  IV medication in a PCA pump    Lisinopril Rash       Problem List:    Patient Active Problem List   Diagnosis Code    Hypertension I10    Bilateral carotid artery disease (HCC) I77.9    Hypercholesteremia E78.00    Thyroid nodule E04.1    Elevated hemoglobin (HCC) D58.2    Hx of basal cell carcinoma Z85.828    Low blood sugar E16.2    Mild depression F32. A       Past Medical History:        Diagnosis Date    Bilateral carotid artery disease (HCC)     Elevated hemoglobin (HCC)     Hx of basal cell carcinoma     Hyperlipidemia     Hypertension     Low blood sugar     Mild depression     Thyroid nodule        Past Surgical History:        Procedure Laterality Date    APPENDECTOMY      BREAST SURGERY      I&D breast abscess age 8    CHOLECYSTECTOMY      COLONOSCOPY N/A 08/09/2021     LEFT SHOULDER LESION BIOPSY EXCISION X 3 (Left ) RIGHT BACK LESION BIOPSY EXCISION X2 (RIGHT) HEMORROIDECTOMY(N/A) COLORECTAL CANCER SCREENING NOT HIGH RISK    COLONOSCOPY N/A 8/9/2021    COLONOSCOPY WITH BIOPSY performed by Rocio Singh DO at [facility] N/A 8/9/2021    HEMORRHOIDECTOMY performed by Rocio Singh DO at [address] [place] [address] ([address] [place] [address])      SHOULDER SURGERY Left 8/9/2021    LEFT SHOULDER LESION BIOPSY EXCISION X 3 performed by Rocio Singh DO at [facility] Right 8/9/2021    RIGHT BACK LESION BIOPSY EXCISION X 2 performed by Rocio Singh DO at [address] [place] [address]         Social History:    Social History     Tobacco Use    Smoking status: Former    Smokeless tobacco: Never   Substance Use Topics    Alcohol use:  Yes                                Counseling given: Not Answered      Vital Signs (Current):   Vitals:    11/17/22 0737   BP: (!) 146/52   Pulse: 67   Resp: 16   Temp: (!) 96.6 °F (35.9 °C)   TempSrc: Temporal   SpO2: 97%   Weight: 104 lb 15 oz (47.6 kg)   Height: 5' 1\" (1.549 m)                                              BP Readings from Last 3 Encounters:   11/17/22 (!) 146/52   11/07/22 (!) 146/68   08/09/21 (!) 111/53       NPO Status: Time of last liquid consumption: 2200                        Time of last solid consumption: 2000                        Date of last liquid consumption: 11/16/22                        Date of last solid food consumption: 11/16/22    BMI:   Wt Readings from Last 3 Encounters:   11/17/22 104 lb 15 oz (47.6 kg)   11/07/22 104 lb 15 oz (47.6 kg)   08/23/21 104 lb (47.2 kg)     Body mass index is 19.83 kg/m². CBC:   Lab Results   Component Value Date/Time    WBC 8.1 11/07/2022 10:46 AM    RBC 5.45 11/07/2022 10:46 AM    HGB 15.4 11/07/2022 10:46 AM    HCT 50.7 11/07/2022 10:46 AM    MCV 93.0 11/07/2022 10:46 AM    RDW 14.9 11/07/2022 10:46 AM     11/07/2022 10:46 AM       CMP:   Lab Results   Component Value Date/Time     11/07/2022 10:46 AM    K 3.4 11/07/2022 10:46 AM     11/07/2022 10:46 AM    CO2 28 11/07/2022 10:46 AM    BUN 15 11/07/2022 10:46 AM    CREATININE 0.78 11/07/2022 10:46 AM    GFRAA >60 02/12/2020 01:20 PM    LABGLOM >60 11/07/2022 10:46 AM    GLUCOSE 131 11/07/2022 10:46 AM    PROT 7.3 02/12/2020 01:20 PM    CALCIUM 10.1 11/07/2022 10:46 AM    BILITOT 0.33 02/12/2020 01:20 PM    ALKPHOS 97 02/12/2020 01:20 PM    AST 23 02/12/2020 01:20 PM    ALT 15 02/12/2020 01:20 PM       POC Tests: No results for input(s): POCGLU, POCNA, POCK, POCCL, POCBUN, POCHEMO, POCHCT in the last 72 hours.     Coags:   Lab Results   Component Value Date/Time    PROTIME 12.4 11/07/2022 10:46 AM    INR 0.9 11/07/2022 10:46 AM    APTT 30.9 11/07/2022 10:46 AM       HCG (If Applicable): No results found for: PREGTESTUR, PREGSERUM, HCG, HCGQUANT     ABGs: No results found for: PHART, PO2ART, FJN9JSS, ZGL6UYK, BEART, T5XMTZVP     Type & Screen (If Applicable):  No results found for: LABABO, LABRH    Drug/Infectious Status (If Applicable):  No results found for: HIV, HEPCAB    COVID-19 Screening (If Applicable): No results found for: COVID19        Anesthesia Evaluation  Patient summary reviewed and Nursing notes reviewed no history of anesthetic complications:   Airway: Mallampati: II  TM distance: >3 FB   Neck ROM: full  Mouth opening: > = 3 FB   Dental: normal exam         Pulmonary:normal exam        (-) COPD and asthma                           Cardiovascular:  Exercise tolerance: good (>4 METS),   (+) hypertension:, hyperlipidemia      ECG reviewed    Rate: normal  Echocardiogram reviewed  Stress test reviewed  Cleared by cardiology              Neuro/Psych:   (+) psychiatric history:            GI/Hepatic/Renal:        (-) GERD       Endo/Other:        (-) diabetes mellitus               Abdominal:             Vascular: Other Findings:           Anesthesia Plan      general     ASA 3       Induction: intravenous. arterial line  MIPS: Prophylactic antiemetics administered. Anesthetic plan and risks discussed with patient. Plan discussed with CRNA.     Attending anesthesiologist reviewed and agrees with Preprocedure content                Rico Briceño DO   11/17/2022

## 2022-11-17 NOTE — BRIEF OP NOTE
Brief Postoperative Note      Patient: Mikal Butcher  YOB: 1935  MRN: [id number]    Date of Procedure: 11/17/2022    Pre-Op Diagnosis: Critical 98% stenosis of left carotid artery [I65.22]  Left internal carotid artery tortuosity    Post-Op Diagnosis: Same       Procedure(s):  LEFT TYPE I EVERSION CAROTID ENDARTERECTOMY  REIMPLANTATION OF LEFT INTERNAL CAROTID ARTERY    Surgeon(s):  Dania Pritchard MD    Assistant:  Surgical Assistant: Esperanza Rudolph    Anesthesia: General    Estimated Blood Loss (mL): 20    Complications: None    Specimens:   ID Type Source Tests Collected by Time Destination   A : LEFT CAROTID PLAQUE Tissue Carotid Arteries SURGICAL PATHOLOGY Dania Pritchard MD 11/17/2022 1133        Implants:  * No implants in log *      Drains: * No LDAs found *    Findings: Critical 98% stenosis of the left internal carotid artery with associated tortuosity    Electronically signed by Dania Pritchard MD on 11/17/2022 at 12:14 PM

## 2022-11-17 NOTE — CONSULTS
Consult note  [facility].,    Adult Hospitalist      Name: Elva Avila  MRN: [id number]     Acct: [de-identified]  Room: 2034/2034-01    Admit Date: 11/17/2022  7:23 AM  PCP: Marion Pringle MD    Primary Problem  Principal Problem:    Left carotid stenosis  Resolved Problems:    * No resolved hospital problems. *        Assesment:   Left carotid artery stenosis 98%, left internal carotid artery tortuous disease  Status post left type I eversion carotid endarterectomy patient of left internal carotid artery on 11/17/2022  Essential hypertension  Mixed hyperlipidemia    Plan:     Patient is admitted to CICU status post surgery  Resume aspirin  Resume amlodipine  Resume atorvastatin  Patient is receiving postoperative IV antibiotics with IV clindamycin  Discussed with family at bedside  Discussed with RN at bedside  Continue to monitor/telemetry/CBC with differential daily/BMP daily  DVT and GI prophylaxis. Continue medications as below      Scheduled Meds:   amLODIPine  7.5 mg Oral Daily    aspirin  81 mg Oral Daily    therapeutic multivitamin-minerals  1 tablet Oral Daily    atorvastatin  20 mg Oral Nightly    sodium chloride flush  5-40 mL IntraVENous 2 times per day    clindamycin (CLEOCIN) IV  900 mg IntraVENous Q8H     Continuous Infusions:   lactated ringers      sodium chloride       PRN Meds:  sodium chloride flush, 5-40 mL, PRN  sodium chloride, , PRN  morphine, 2 mg, Q2H PRN   Or  morphine, 4 mg, Q2H PRN        Chief Complaint:     Medical management    History of Present Illness:      Elva Avila is a 82 y.o.  female who presents with No chief complaint on file.   This is a 71-year-old female has been admitted to vascular surgery, patient is admitted for left carotid endarterectomy, patient was noticed to have critical stenosis 98% of left carotid artery and left internal carotid artery tortuosity, further patient underwent left eversion carotid endarterectomy and reimplantation of left internal carotid artery on 11/17/2022, patient tolerated the procedure well, patient has past medical history significant for hypertension hyperlipidemia and other medical problems listed below, patient denies any chest pain no nausea vomiting diarrhea, no headache no palpitation, no focal logical deficit overall she is feeling better    I have personally reviewed the past medical history, past surgical history, medications, social history, and family history, and summarized in the note. Review of Systems:     All 10 point system is reviewed and negative otherwise mentioned in HPI.       Past Medical History:     Past Medical History:   Diagnosis Date    Bilateral carotid artery disease (HCC)     Elevated hemoglobin (HCC)     Hx of basal cell carcinoma     Hyperlipidemia     Hypertension     Low blood sugar     Mild depression     Thyroid nodule         Past Surgical History:     Past Surgical History:   Procedure Laterality Date    APPENDECTOMY      BREAST SURGERY      I&D breast abscess age 8    CAROTID ENDARTERECTOMY Left 11/17/2022    CAROTID ENDARTERECTOMY Left 11/17/2022    LEFT TYPE ONE EVERSION CAROTID ENDARTERECTOMY, REIMPLANTATION OF LEFT CAROTID ARTERY performed by Ashley Stokes MD at [address]      COLONOSCOPY N/A 08/09/2021     LEFT SHOULDER LESION BIOPSY EXCISION X 3 (Left ) RIGHT BACK LESION BIOPSY EXCISION X2 (RIGHT) HEMORROIDECTOMY(N/A) COLORECTAL CANCER SCREENING NOT HIGH RISK    COLONOSCOPY N/A 08/09/2021    COLONOSCOPY WITH BIOPSY performed by Mis Capellan DO at [address] N/A 08/09/2021    HEMORRHOIDECTOMY performed by Mis Capellan DO at [facility] ([address] [facility] [address])      SHOULDER SURGERY Left 08/09/2021    LEFT SHOULDER LESION BIOPSY EXCISION X 3 performed by Mis Capellan DO at [address] [facility] [address] Right 08/09/2021    RIGHT BACK LESION BIOPSY EXCISION X 2 performed by Dc Ortiz Rusty, DO at [address] [place] [address]          Medications Prior to Admission:       Prior to Admission medications    Medication Sig Start Date End Date Taking? Authorizing Provider   atorvastatin (LIPITOR) 20 MG tablet Take 20 mg by mouth daily   Yes Historical Provider, MD   ibuprofen (ADVIL;MOTRIN) 600 MG tablet Take 1 tablet by mouth 4 times daily as needed for Pain  Patient not taking: Reported on 2022   Verónica Barnett,    Multiple Vitamins-Minerals (THERAPEUTIC MULTIVITAMIN-MINERALS) tablet Take 1 tablet by mouth daily    Historical Provider, MD   amLODIPine (NORVASC) 5 MG tablet Take 7.5 mg by mouth daily    Historical Provider, MD   aspirin 81 MG EC tablet Take 81 mg by mouth daily    Historical Provider, MD        Allergies:       Penicillins, Monosodium glutamate, Morphine, and Lisinopril    Social History:     Tobacco:    reports that she has quit smoking. She has never used smokeless tobacco.  Alcohol:      reports current alcohol use. Drug Use:  has no history on file for drug use.     Family History:     Family History   Problem Relation Age of Onset    Hypertension Father          Physical Exam:     Vitals:  /65   Pulse 54   Temp 97.3 °F (36.3 °C) (Temporal)   Resp 15   Ht 5' 1\" (1.549 m)   Wt 104 lb 15 oz (47.6 kg)   SpO2 93%   BMI 19.83 kg/m²   Temp (24hrs), Av °F (36.1 °C), Min:96.6 °F (35.9 °C), Max:97.3 °F (36.3 °C)          General appearance - alert, well appearing, and in no acute distress  Mental status - oriented to person, place, and time with normal affect  Head - normocephalic and atraumatic  Eyes - pupils equal and reactive, extraocular eye movements intact, conjunctiva clear  Ears - hearing appears to be intact  Nose - no drainage noted  Mouth - mucous membranes moist  Neck - supple, no carotid bruits, thyroid not palpable  Chest - clear to auscultation, normal effort  Heart - normal rate, regular rhythm, no murmur  Abdomen - soft, nontender, nondistended, bowel sounds present all four quadrants, no masses, hepatomegaly or splenomegaly  Neurological - normal speech, no focal findings or movement disorder noted, cranial nerves II through XII grossly intact  Extremities - peripheral pulses palpable, no pedal edema or calf pain with palpation  Skin - no gross lesions, rashes, or induration noted        Data:     Labs:    Hematology:No results for input(s): WBC, RBC, HGB, HCT, MCV, MCH, MCHC, RDW, PLT, MPV, SEDRATE, CRP, INR, DDIMER, XU8YQZOJ, LABABSO in the last 72 hours. Invalid input(s): PT  Chemistry:No results for input(s): NA, K, CL, CO2, GLUCOSE, BUN, CREATININE, MG, ANIONGAP, LABGLOM, GFRAA, CALCIUM, CAION, PHOS, PSA, PROBNP, TROPHS, CKTOTAL, CKMB, CKMBINDEX, MYOGLOBIN, DIGOXIN, LACTACIDWB in the last 72 hours. No results for input(s): PROT, LABALBU, LABA1C, V2CJDNV, O5DEYCI, FT4, TSH, AST, ALT, LDH, GGT, ALKPHOS, LABGGT, BILITOT, BILIDIR, AMMONIA, AMYLASE, LIPASE, LACTATE, CHOL, HDL, LDLCHOLESTEROL, CHOLHDLRATIO, TRIG, VLDL, FSX38NH, PHENYTOIN, PHENYF, URICACID, POCGLU in the last 72 hours. Lab Results   Component Value Date    INR 0.9 11/07/2022    PROTIME 12.4 11/07/2022       No results found for: SPECIAL  Lab Results   Component Value Date/Time    CULTURE NO GROWTH 11/07/2022 10:45 AM       No results found for: POCPH, PHART, PH, POCPCO2, ZSR8JUA, PCO2, POCPO2, PO2ART, PO2, POCHCO3, QEW8PEC, HCO3, NBEA, PBEA, BEART, BE, THGBART, THB, ILE2FKA, GYZP3CBJ, X2MTNLJW, O2SAT, FIO2    Radiology:    XR CHEST (2 VW)    Result Date: 11/17/2022  No active pulmonary or pleural disease. Findings consistent with obstructive pulmonary disease and linear fibrotic change.          All radiological studies reviewed                Code Status:  Full Code    Electronically signed by Kal Roman MD on 11/17/2022 at 3:15 PM     Copy sent to Dr. Frantz Mcconnell MD    This note was created with the assistance of a speech-recognition program. Although the intention is to generate a document that actually reflects the content of the visit, no guarantees can be provided that every mistake has been identified and corrected by editing. Note was updated later by me after  physical examination and  completion of the assessment.

## 2022-11-17 NOTE — ANESTHESIA POSTPROCEDURE EVALUATION
Department of Anesthesiology  Postprocedure Note    Patient: Jeramy Cordero  MRN: [id number]  YOB: 1935  Date of evaluation: 11/17/2022      Procedure Summary     Date: 11/17/22 Room / Location: [address] - INPATIENT    Anesthesia Start: 2866 Anesthesia Stop: 1210    Procedure: LEFT TYPE ONE EVERSION CAROTID ENDARTERECTOMY, REIMPLANTATION OF LEFT CAROTID ARTERY (Left: Neck) Diagnosis:       Stenosis of left carotid artery      (Stenosis of left carotid artery [I65.22])    Surgeons: Shaka Zarate MD Responsible Provider: Melody Aparicio DO    Anesthesia Type: general ASA Status: 3          Anesthesia Type: No value filed.     Margarito Phase I: Margarito Score: 8    Margarito Phase II:        Anesthesia Post Evaluation    Patient location during evaluation: PACU  Patient participation: complete - patient participated  Level of consciousness: awake and alert  Airway patency: patent  Nausea & Vomiting: no nausea and no vomiting  Complications: no  Cardiovascular status: hemodynamically stable  Respiratory status: acceptable  Hydration status: stable

## 2022-11-18 VITALS
RESPIRATION RATE: 18 BRPM | HEART RATE: 76 BPM | TEMPERATURE: 97.6 F | WEIGHT: 105.5 LBS | SYSTOLIC BLOOD PRESSURE: 120 MMHG | OXYGEN SATURATION: 93 % | DIASTOLIC BLOOD PRESSURE: 62 MMHG | BODY MASS INDEX: 19.92 KG/M2 | HEIGHT: 61 IN

## 2022-11-18 LAB
HCT VFR BLD CALC: 37 % (ref 36.3–47.1)
HEMOGLOBIN: 11.4 G/DL (ref 11.9–15.1)
MAGNESIUM: 1.6 MG/DL (ref 1.6–2.6)
MCH RBC QN AUTO: 29.2 PG (ref 25.2–33.5)
MCHC RBC AUTO-ENTMCNC: 30.8 G/DL (ref 28.4–34.8)
MCV RBC AUTO: 94.6 FL (ref 82.6–102.9)
NRBC AUTOMATED: 0 PER 100 WBC
PDW BLD-RTO: 14.6 % (ref 11.8–14.4)
PHOSPHORUS: 2.5 MG/DL (ref 2.6–4.5)
PLATELET # BLD: 290 K/UL (ref 138–453)
PMV BLD AUTO: 10.3 FL (ref 8.1–13.5)
RBC # BLD: 3.91 M/UL (ref 3.95–5.11)
WBC # BLD: 14.4 K/UL (ref 3.5–11.3)

## 2022-11-18 PROCEDURE — 83735 ASSAY OF MAGNESIUM: CPT

## 2022-11-18 PROCEDURE — 93005 ELECTROCARDIOGRAM TRACING: CPT | Performed by: INTERNAL MEDICINE

## 2022-11-18 PROCEDURE — 36415 COLL VENOUS BLD VENIPUNCTURE: CPT

## 2022-11-18 PROCEDURE — 6370000000 HC RX 637 (ALT 250 FOR IP): Performed by: SURGERY

## 2022-11-18 PROCEDURE — 2500000003 HC RX 250 WO HCPCS: Performed by: SURGERY

## 2022-11-18 PROCEDURE — 85027 COMPLETE CBC AUTOMATED: CPT

## 2022-11-18 PROCEDURE — 2580000003 HC RX 258: Performed by: SURGERY

## 2022-11-18 PROCEDURE — 84100 ASSAY OF PHOSPHORUS: CPT

## 2022-11-18 RX ORDER — CLINDAMYCIN PHOSPHATE 900 MG/50ML
900 INJECTION INTRAVENOUS ONCE
Status: COMPLETED | OUTPATIENT
Start: 2022-11-18 | End: 2022-11-18

## 2022-11-18 RX ADMIN — Medication 1 TABLET: at 09:25

## 2022-11-18 RX ADMIN — SODIUM CHLORIDE, PRESERVATIVE FREE 10 ML: 5 INJECTION INTRAVENOUS at 09:25

## 2022-11-18 RX ADMIN — CLINDAMYCIN PHOSPHATE 900 MG: 900 INJECTION, SOLUTION INTRAVENOUS at 06:19

## 2022-11-18 RX ADMIN — CLINDAMYCIN IN 5 PERCENT DEXTROSE 900 MG: 18 INJECTION, SOLUTION INTRAVENOUS at 06:19

## 2022-11-18 RX ADMIN — ASPIRIN 81 MG: 81 TABLET, COATED ORAL at 09:25

## 2022-11-18 RX ADMIN — AMLODIPINE BESYLATE 7.5 MG: 5 TABLET ORAL at 09:25

## 2022-11-18 NOTE — FLOWSHEET NOTE
11/17/22 1855   Treatment Team Notification   Reason for Communication Review case   Team Member Name Dr Nestor Win Team Role Consulting Provider   Method of Communication Secure Message   Response Other (Comment)   Notification Time 1911     Notified of consult, will see tomorrow.

## 2022-11-18 NOTE — FLOWSHEET NOTE
11/17/22 1837   Treatment Team Notification   Reason for Communication Review case   Team Member Name Dr Mikal Amaral Provider   Method of Communication Secure Message   Response See orders   Notification Time 1838     Notified of arrhythmia, see orders

## 2022-11-18 NOTE — PROGRESS NOTES
VASCULAR SURGERY  PROGRESS NOTE  POST-OP CAROTID ENDARTERECTOMY    11/18/2022  2:36 PM     Durward Lesches    1935   [id number]        SUBJECTIVE:  Patient awake and alert. No complaints. Good pain control. Denies nausea. OBJECTIVE    Physical  VITALS:  /73   Pulse 74   Temp 97.6 °F (36.4 °C) (Temporal)   Resp 18   Ht 5' 1\" (1.549 m)   Wt 105 lb 8 oz (47.9 kg)   SpO2 92%   BMI 19.93 kg/m²     CONSTITUTIONAL:  awake, alert, cooperative, no apparent distress and appears stated age  NECK: Incision clean and dry with no unexpected swelling. NEUROLOGIC:  Mental status unchanged. Motor and sensory function intact. Tongue midline. Data  Hemoglobin   Date/Time Value Ref Range Status   11/18/2022 03:16 AM 11.4 (L) 11.9 - 15.1 g/dL Final     Hematocrit   Date/Time Value Ref Range Status   11/18/2022 03:16 AM 37.0 36.3 - 47.1 % Final     Sodium   Date/Time Value Ref Range Status   11/07/2022 10:46  135 - 144 mmol/L Final     Potassium   Date/Time Value Ref Range Status   11/07/2022 10:46 AM 3.4 (L) 3.7 - 5.3 mmol/L Final     Chloride   Date/Time Value Ref Range Status   11/07/2022 10:46  98 - 107 mmol/L Final     CO2   Date/Time Value Ref Range Status   11/07/2022 10:46 AM 28 20 - 31 mmol/L Final     BUN   Date/Time Value Ref Range Status   11/07/2022 10:46 AM 15 8 - 23 mg/dL Final       ASSESSMENT AND PLAN    82 y.o. female doing well status post Left Carotid Endarterectomy       Plan home today. Follow-up in Office. Continue aspirin. Call for problems.     Electronically signed by Bob García MD on 11/18/2022 at 2:36 PM

## 2022-11-18 NOTE — PLAN OF CARE
Problem: Discharge Planning  Goal: Discharge to home or other facility with appropriate resources  11/18/2022 1723 by Justus Barker RN  Outcome: Adequate for Discharge     Problem: Pain  Goal: Verbalizes/displays adequate comfort level or baseline comfort level  11/18/2022 1723 by Justus Barker RN  Outcome: Adequate for Discharge     Problem: Safety - Adult  Goal: Free from fall injury  11/18/2022 1723 by Justus Barker RN  Outcome: Adequate for Discharge     Problem: ABCDS Injury Assessment  Goal: Absence of physical injury  11/18/2022 1723 by Justus Barker RN  Outcome: Adequate for Discharge

## 2022-11-18 NOTE — DISCHARGE SUMMARY
VASCULAR SURGERY   DISCHARGE SUMMARY      Patient Identification  Luis Scanlon is a 82 y.o. female. :  1935  Admit Date:  2022    Discharge date:   No discharge date for patient encounter. Disposition: home    Discharge Diagnoses:   Patient Active Problem List   Diagnosis    Hypertension    Bilateral carotid artery disease (HCC)    Hypercholesteremia    Thyroid nodule    Elevated hemoglobin (HCC)    Hx of basal cell carcinoma    Low blood sugar    Mild depression    Left carotid stenosis         Consults: IM    Surgery: Left type I eversion carotid endarterectomy  Reimplantation of left internal carotid artery    Patient Instructions: Activity: no heavy lifting, pushing, pulling for 6 weeks, no driving for 2 weeks or while on analgesics  Diet: As tolerated  Follow-up with Nathan Kwan MD in 4 weeks. See pre-printed instructions in chart and given to patient upon discharge. Discharge Medications:      Medication List      CONTINUE taking these medications     amLODIPine 5 MG tablet; Commonly known as: NORVASC   aspirin 81 MG EC tablet   atorvastatin 20 MG tablet; Commonly known as: LIPITOR   therapeutic multivitamin-minerals tablet          HPI and Hospital Course: 30-year-old female underwent a left carotid endarterectomy without incident. Normal postoperative course. She was discharged home on postoperative day #1 in good condition. Instructed to follow-up with the office in 4 weeks.         Nathan Kwan MD MD FACS

## 2022-11-18 NOTE — CARE COORDINATION
11/18/22 1610   Service Assessment   Patient Orientation Alert and Oriented;Person;Place;Situation;Self   Cognition Alert   Primary Caregiver Self   Support Systems Spouse/Significant Other   Patient's Healthcare Decision Maker is: Legal Next of Kin   PCP Verified by CM Yes   Last Visit to PCP Within last 3 months   Prior Functional Level Independent in ADLs/IADLs   Current Functional Level Independent in ADLs/IADLs   Can patient return to prior living arrangement Yes   Ability to make needs known: Good   Family able to assist with home care needs: Yes   Would you like for me to discuss the discharge plan with any other family members/significant others, and if so, who? No   Social/Functional History   Lives With Significant other   Type of [address] One level   Bathroom Shower/Tub Tub/Shower unit   Bathroom Toilet Standard   Bathroom Accessibility Accessible   Receives Help From Johnna Oakley.   Ambulation Assistance Independent   Transfer Assistance Independent   Active  Yes   Mode of Transportation Car   Occupation Unemployed   Discharge Planning   Type of [address] Prior To Admission None   Potential Assistance Needed N/A   DME Ordered? No   Type of Home Care Services None   Patient expects to be discharged to: House   Follow Up Appointment: Best Day/Time  Monday AM   One/Two Story Residence One story   History of falls? 0   Services At/After Discharge   Services At/After Discharge None   [employer] Information Provided? No   Mode of Transport at Discharge Other (see comment)  (family)   Confirm Follow Up Transport Family   Condition of Participation: Discharge Planning   The Plan for Transition of Care is related to the following treatment goals: home independently with spouse. No DME and drives. Has family support. Plan to discharge today.    The Patient and/or Patient Representative was provided with a Choice of Provider? Patient   The Patient and/Or Patient Representative agree with the Discharge Plan? Yes   Freedom of Choice list was provided with basic dialogue that supports the patient's individualized plan of care/goals, treatment preferences, and shares the quality data associated with the providers? Yes   home independently with spouse. No DME and drives. Has family support. Plan to discharge today.

## 2022-11-18 NOTE — DISCHARGE INSTRUCTIONS
Activity: no heavy lifting, pushing, pulling for 6 weeks, no driving for 2 weeks or while on analgesics  Diet: As tolerated  Follow-up with Jo Ann Salazar MD in 4 weeks. 11-Jan-2018

## 2022-11-18 NOTE — CONSULTS
Section of Cardiology   Consult Note      Reason for Consult: Arrhythmia  Requesting Physician: Jonell Osler, MD    CHIEF COMPLAINT: Post carotid surgery    History Obtained From:  patient, electronic medical record, patient's nurse     HISTORY OF PRESENT ILLNESS:      The patient is a 82 y.o. female with significant past medical history of systemic hypertension, peripheral vascular disease, hyperlipidemia who presents with elective surgery for carotid and had short pause yesterday evening with some arrhythmia and cardiology consult was called. At the time I saw the patient she appeared to be comfortable however she is complaining of headache and neck pain. She was seen by the surgeon who cleared her. The patient was seen by my partner, Dr. Funmilayo Romero. The patient does not report chest pain. No specific heart disease. The patient denies history of dizziness. No syncope. No falls. No individual weakness or numbness in the arm or leg. Previous diagnostic testing for coronary artery disease includes: echocardiogram, persantine thallium. Previous history of cardiac disease includes: none. Coronary artery disease risk factors include: advanced age (older than 54 for men, 72 for women), hypertension, and sedentary lifestyle.     Past Medical History:    Past Medical History:   Diagnosis Date    Bilateral carotid artery disease (HCC)     Elevated hemoglobin (HCC)     Hx of basal cell carcinoma     Hyperlipidemia     Hypertension     Low blood sugar     Mild depression     Thyroid nodule      Past Surgical History:    Past Surgical History:   Procedure Laterality Date    APPENDECTOMY      BREAST SURGERY      I&D breast abscess age 8    CAROTID ENDARTERECTOMY Left 11/17/2022    CAROTID ENDARTERECTOMY Left 11/17/2022    LEFT TYPE ONE EVERSION CAROTID ENDARTERECTOMY, REIMPLANTATION OF LEFT CAROTID ARTERY performed by Jonell Osler, MD at [facility] N/A 08/09/2021     LEFT SHOULDER LESION BIOPSY EXCISION X 3 (Left ) RIGHT BACK LESION BIOPSY EXCISION X2 (RIGHT) HEMORROIDECTOMY(N/A) COLORECTAL CANCER SCREENING NOT HIGH RISK    COLONOSCOPY N/A 08/09/2021    COLONOSCOPY WITH BIOPSY performed by David Eckert DO at [address] N/A 08/09/2021    [facility]MORR[facility]      SHOULDER SURGERY Left 08/09/2021    LEFT SHOULDER LESION BIOPSY EXCISION X 3 performed by David Eckert DO at [address] Right 08/09/2021    RIGHT BACK LESION BIOPSY EXCISION X 2 performed by David Eckert DO at [facility] Medications:  Prior to Admission medications    Medication Sig Start Date End Date Taking?  Authorizing Provider   atorvastatin (LIPITOR) 20 MG tablet Take 20 mg by mouth daily   Yes Historical Provider, MD   Multiple Vitamins-Minerals (THERAPEUTIC MULTIVITAMIN-MINERALS) tablet Take 1 tablet by mouth daily    Historical Provider, MD   amLODIPine (NORVASC) 5 MG tablet Take 7.5 mg by mouth daily    Historical Provider, MD   aspirin 81 MG EC tablet Take 81 mg by mouth daily    Historical Provider, MD     Current Medications:    Current Facility-Administered Medications   Medication Dose Route Frequency Provider Last Rate Last Admin    amLODIPine (NORVASC) tablet 7.5 mg  7.5 mg Oral Daily Brit Stock MD        aspirin EC tablet 81 mg  81 mg Oral Daily Brit Stock MD        therapeutic multivitamin-minerals 1 tablet  1 tablet Oral Daily Brit Stock MD        atorvastatin (LIPITOR) tablet 20 mg  20 mg Oral Nightly Brit Stock MD   20 mg at 11/17/22 2055    lactated ringers infusion   IntraVENous Continuous Brit Stock MD   Stopped at 11/18/22 0406    sodium chloride flush 0.9 % injection 5-40 mL  5-40 mL IntraVENous 2 times per day Brit Stock MD   10 mL at 11/17/22 2055    sodium chloride flush 0.9 % injection 5-40 mL  5-40 mL IntraVENous PRN Jonell Osler, MD        0.9 % sodium chloride infusion   IntraVENous PRN Jonell Osler, MD        morphine (PF) injection 2 mg  2 mg IntraVENous Q2H PRN Jonell Osler, MD        Or    morphine sulfate (PF) injection 4 mg  4 mg IntraVENous Q2H PRN Jonell Osler, MD         Allergies:  Penicillins, Monosodium glutamate, Morphine, and Lisinopril    Social History:    Social History     Socioeconomic History    Marital status:      Spouse name: Not on file    Number of children: Not on file    Years of education: Not on file    Highest education level: Not on file   Occupational History    Not on file   Tobacco Use    Smoking status: Former    Smokeless tobacco: Never   Vaping Use    Vaping Use: Never used   Substance and Sexual Activity    Alcohol use: Yes    Drug use: Not on file    Sexual activity: Not on file   Other Topics Concern    Not on file   Social History Narrative    Not on file     Social Determinants of Health     Financial Resource Strain: Not on file   Food Insecurity: Not on file   Transportation Needs: Not on file   Physical Activity: Not on file   Stress: Not on file   Social Connections: Not on file   Intimate Partner Violence: Not on file   Housing Stability: Not on file     Family History:   Family History   Problem Relation Age of Onset    Hypertension Father        REVIEW OF SYSTEMS   Patient denies any fever chills or cough. No PND orthopnea. Denies any edema or claudication. Denies any bleeding.   Vitals:    VITALS:  /73   Pulse 65   Temp 98.4 °F (36.9 °C) (Temporal)   Resp 18   Ht 5' 1\" (1.549 m)   Wt 105 lb 8 oz (47.9 kg)   SpO2 93%   BMI 19.93 kg/m²   24HR INTAKE/OUTPUT:    Intake/Output Summary (Last 24 hours) at 11/18/2022 0903  Last data filed at 11/18/2022 0300  Gross per 24 hour   Intake 965 ml   Output 2050 ml   Net -1085 ml       CONSTITUTIONAL:  awake, alert, cooperative, no apparent distress, and appears stated age  EYES: Pupils equal, round and reactive to light, extra ocular muscles intact, sclera clear, conjunctiva normal  ENT:  normocepalic, without obvious abnormality  NECK:  supple, symmetrical, trachea midline, no carotid bruit ,   No  JVD  BACK:  symmetric  LUNGS: Non-labored, good air exchange, clear to auscultation bilaterally, no crackles or wheezing  CARDIOVASCULAR:  Normal apical impulse, regular rate and rhythm, normal S1 and S2, no S3 or S4, and no murmur noted, no rub.  radial and bilateralpresent 1+  ABDOMEN:  No scars, normal bowel sounds, soft, non-distended, non-tender,   MUSCULOSKELETAL:  there is no redness, warmth, or swelling of the joints   No legs edema. NEUROLOGIC:  Awake, alert, oriented to name, place and time. SKIN:  no bruising or bleeding, normal skin color, texture, turgor and no jaundice    DATA:   ECG:    ECHO: Date: Echocardiogram done at our office showed normal LV systolic function     Stress Test: Stress test at [facility] clinic was negative for ischemia  Angiography:  Not performed to date    Cardiology Labs:No results for input(s): MYOGLOBIN, CKTOTAL, CKMB, CKMBINDEX, TROPONINT, BNP in the last 72 hours.   Warfarin PT/INR:  Lab Results   Component Value Date/Time    PROTIME 12.4 11/07/2022 10:46 AM    INR 0.9 11/07/2022 10:46 AM     CBC:  Lab Results   Component Value Date/Time    WBC 14.4 11/18/2022 03:16 AM    RBC 3.91 11/18/2022 03:16 AM    HGB 11.4 11/18/2022 03:16 AM    HCT 37.0 11/18/2022 03:16 AM    MCV 94.6 11/18/2022 03:16 AM    MCH 29.2 11/18/2022 03:16 AM    MCHC 30.8 11/18/2022 03:16 AM    RDW 14.6 11/18/2022 03:16 AM     11/18/2022 03:16 AM    MPV 10.3 11/18/2022 03:16 AM     CMP:  Lab Results   Component Value Date/Time     11/07/2022 10:46 AM    K 3.4 11/07/2022 10:46 AM     11/07/2022 10:46 AM    CO2 28 11/07/2022 10:46 AM    BUN 15 11/07/2022 10:46 AM    CREATININE 0.78 11/07/2022 10:46 AM    GFRAA >60 02/12/2020 01:20 PM    LABGLOM >60 11/07/2022 10:46 AM    GLUCOSE 131 11/07/2022 10:46 AM    CALCIUM 10.1 11/07/2022 10:46 AM     Magnesium:    Lab Results   Component Value Date/Time    MG 1.6 11/18/2022 03:16 AM     PTT:    Lab Results   Component Value Date/Time    APTT 30.9 11/07/2022 10:46 AM     TSH:    Lab Results   Component Value Date/Time    TSH 1.46 02/12/2020 01:20 PM     BMP:  Lab Results   Component Value Date/Time     11/07/2022 10:46 AM    K 3.4 11/07/2022 10:46 AM     11/07/2022 10:46 AM    CO2 28 11/07/2022 10:46 AM    BUN 15 11/07/2022 10:46 AM    LABALBU 4.5 02/12/2020 01:20 PM    CREATININE 0.78 11/07/2022 10:46 AM    CALCIUM 10.1 11/07/2022 10:46 AM    GFRAA >60 02/12/2020 01:20 PM    LABGLOM >60 11/07/2022 10:46 AM    GLUCOSE 131 11/07/2022 10:46 AM     LIVER PROFILE:No results for input(s): AST, ALT, LABALBU, ALKPHOS, BILITOT, BILIDIR, IBILI, PROT, GLOB, ALBUMIN in the last 72 hours. FLP:    Lab Results   Component Value Date/Time    CHOL 292 03/03/2016 09:25 AM    TRIG 119 03/03/2016 09:25 AM     04/30/2019 08:13 AM    LDLCHOLESTEROL 113 04/30/2019 08:13 AM         IMPRESSION  3 seconds pause secondary to second-degree AV block which remained asymptomatic  Atherosclerotic heart disease  Status post carotid endarterectomy  Systemic hypertension  Patient Active Problem List   Diagnosis    Hypertension    Bilateral carotid artery disease (HCC)    Hypercholesteremia    Thyroid nodule    Elevated hemoglobin (HCC)    Hx of basal cell carcinoma    Low blood sugar    Mild depression    Left carotid stenosis           RECOMMENDATIONS:     Continue current medications  Management plan was discussed with patient     EKG was ordered and that showed normal sinus rhythm without acute ST-T changes. I think her second-degree AV block was transient and carrying no significance at this point in time however no guarantee that it will not happen in the future.   The patient is not on any negative chronotropic agent. Ambulate as tolerated  Discharge home today and follow-up with her cardiologist at our office. Patient understood. Discussed with the surgeon and patient's nurse  Thank you for consultation.       Electronically signed by Edna Pop MD on 11/18/2022 at 9:03 AM     CC: Suad Langford MD

## 2022-11-18 NOTE — PLAN OF CARE
Problem: Discharge Planning  Goal: Discharge to home or other facility with appropriate resources  Outcome: Progressing     Problem: Pain  Goal: Verbalizes/displays adequate comfort level or baseline comfort level  Outcome: Progressing  Note: No pain overnight concerning incision     Problem: Safety - Adult  Goal: Free from fall injury  Outcome: Progressing     Problem: ABCDS Injury Assessment  Goal: Absence of physical injury  Outcome: Progressing

## 2022-11-18 NOTE — OP NOTE
[id number] [facility] [address]                [address], [address] [place] [address]                                OPERATIVE REPORT    PATIENT NAME: Suki Murrieta                     :        1935  MED REC NO:   [id number]                             ROOM:  ACCOUNT NO:   [de-identified]                           ADMIT DATE: 2022  PROVIDER:     Jonell Osler, MD    DATE OF PROCEDURE:  2022    PREOPERATIVE DIAGNOSES:  1. Critical 98% left internal carotid artery stenosis. 2.  Left internal carotid artery tortuosity. POSTOPERATIVE DIAGNOSES:  1. Critical 98% left internal carotid artery stenosis. 2.  Left internal carotid artery tortuosity. OPERATION PERFORMED:  1. Left type 1 eversion carotid endarterectomy. 2.  Reimplantation of left internal carotid artery. SURGEON:  Jonell Osler, MD    ANESTHESIA:  General endotracheal.    ESTIMATED BLOOD LOSS:  20 mL. COMPLICATIONS:  None. OPERATIVE INDICATIONS:  The patient is an 46-year-old female, who  presents with severe left carotid stenosis by duplex and on CTA was  found to have a string sign with 98% stenosis. Tortuosity is  also noted in the internal carotid. On the right side, there is  approximately 50-60% stenosis at the distal common carotid and bulb  region. At this time, she is being taken to the operating room for  elective left carotid endarterectomy. OPERATIVE TECHNIQUE:  After informed consent had been obtained, the  patient brought to the operating room, placed in supine position, and  general endotracheal anesthesia was induced. Left neck was then prepped  and draped in the usual sterile fashion. Oblique incision was made in  the left neck and subcutaneous tissue was sharply taken down. Bleeding  points being controlled with cautery. The platysma was divided and the  anterior border of sternocleidomastoid was dissected free.   Internal  jugular vein was readily identified and common facial vein was suture  ligated with 3-0 Vicryl and divided. Multiple small venous branches  were tied with 3-0 Vicryl and divided. Carotid bifurcation was then  isolated and common internal and external carotid arteries were  circumferentially dissected and vessels placed around them. Superior  thyroid artery was secured with 0 silk Garcia tie. Tortuosity was noted  within the internal carotid and plaque appeared isolated to the  bifurcation level. Therefore, elected to proceed with a left type 1  eversion endarterectomy. At this point, the patient was intravenously  heparinized with 5000 units of aqueous heparin. External carotid was  clamped with a Sussy followed by the internal carotid artery. Common  carotid was clamped with a Begum clamp. The internal carotid artery  was then sharply amputated from the carotid bulb and the medial aspect  of the internal was opened longitudinally along the lateral aspect of  the common carotid. There was extensive plaque present within the  bifurcation of proximal internal carotid with an area of critical 98%  stenosis. Vessels were irrigated with heparinized saline and a 3 x 5 mm  Sundt shunt was placed and flow reestablished to the brain. Next,  plaque was everted from the internal carotid with an excellent distal  taper point. Attention was turned to the carotid bifurcation and the  plaque was endarterectomized in standard fashion with excellent distal  taper points of both the external and common carotid arteries. Endarterectomized surfaces were irrigated with heparinized saline and  small debris fragments were removed. The edges of the internal carotid  and carotid bulb were then spatulated and the internal carotid was  brought down and reimplanted to the carotid bulb using running 6-0  Prolene suture. Prior to completing closure, the shunt was clamped and  removed. Carotids were irrigated with heparinized saline and the  closure completed.   Flow was reestablished first to the external carotid  followed by the internal carotid artery. Thrombin and Gelfoam was  placed at the closure site. Doppler was used to interrogate both the  internal and external carotid arteries and excellent phasic flow was  noted. 15 mg of protamine was given intravenously. Wound was irrigated  with antibiotic solution and the Gelfoam removed. Suture line was  inspected and found to be hemostatic. Further irrigation was carried  out and the platysma was reapproximated using running 3-0 Vicryl suture  followed by closure of skin with interrupted 4-0 Monocryl subcuticular  suture. Dermabond was applied followed by a sterile dressing. The  patient tolerated the procedure well and was awakened in the operating  room with all four extremities with the tongue in midline. She was  transferred to the recovery room in satisfactory condition. Sponge,  instrument, and needle counts were correct at the conclusion of  operation.         Deanne Parekh MD    D: 11/17/2022 12:25:10       T: 11/17/2022 12:28:12     [id number]  Job#: [id number]     Doc#: [id number]    CC:  MD Shawn Blackmon MD

## 2022-11-18 NOTE — PROGRESS NOTES
Progress note  [facility]    Adult Hospitalist      Name: Andrei Vail  MRN: [id number]     Acct: [de-identified]  Room: 2034/2034-01    Admit Date: 11/17/2022  7:23 AM  PCP: Sarah Mak MD    Primary Problem  Principal Problem:    Left carotid stenosis  Resolved Problems:    * No resolved hospital problems. *        Assesment:   Left carotid artery stenosis 98%, left internal carotid artery tortuous disease  Status post left type I eversion carotid endarterectomy patient of left internal carotid artery on 11/17/2022  Essential hypertension  Mixed hyperlipidemia    Plan:     Patient is admitted to CICU status post surgery  Resume aspirin  Resume amlodipine  Resume atorvastatin  Patient is receiving postoperative IV antibiotics with IV clindamycin  Discussed with family at bedside  Discussed with RN at bedside  Continue to monitor/telemetry/CBC with differential daily/BMP daily  DVT and GI prophylaxis. Continue medications as below  Discharge planning per primary    Scheduled Meds:   amLODIPine  7.5 mg Oral Daily    aspirin  81 mg Oral Daily    therapeutic multivitamin-minerals  1 tablet Oral Daily    atorvastatin  20 mg Oral Nightly    sodium chloride flush  5-40 mL IntraVENous 2 times per day     Continuous Infusions:   lactated ringers Stopped (11/18/22 0406)    sodium chloride       PRN Meds:  sodium chloride flush, 5-40 mL, PRN  sodium chloride, , PRN  morphine, 2 mg, Q2H PRN   Or  morphine, 4 mg, Q2H PRN      Chief Complaint:     Medical management    History of Present Illness:      Andrei Vail is a 82 y.o.  female who presents with No chief complaint on file. Patient seen and examined at bedside and reviewed  last 24 hrs events with nursing staff  No acute events overnight. Patient denies any acute complaints.   Afebrile  Patient denies any chest pain, shortness of Breath, palpitation, headache, dizziness, cough, nausea, vomiting, abdominal pain, pain, changes in urination or bowel habit or rash.            HPI  This is a 82-year-old female has been admitted to vascular surgery, patient is admitted for left carotid endarterectomy, patient was noticed to have critical stenosis 98% of left carotid artery and left internal carotid artery tortuosity, further patient underwent left eversion carotid endarterectomy and reimplantation of left internal carotid artery on 11/17/2022, patient tolerated the procedure well, patient has past medical history significant for hypertension hyperlipidemia and other medical problems listed below, patient denies any chest pain no nausea vomiting diarrhea, no headache no palpitation, no focal logical deficit overall she is feeling better    I have personally reviewed the past medical history, past surgical history, medications, social history, and family history, and summarized in the note. Review of Systems:     All 10 point system is reviewed and negative otherwise mentioned in HPI.       Past Medical History:     Past Medical History:   Diagnosis Date    Bilateral carotid artery disease (HCC)     Elevated hemoglobin (HCC)     Hx of basal cell carcinoma     Hyperlipidemia     Hypertension     Low blood sugar     Mild depression     Thyroid nodule         Past Surgical History:     Past Surgical History:   Procedure Laterality Date    APPENDECTOMY      BREAST SURGERY      I&D breast abscess age 8    CAROTID ENDARTERECTOMY Left 11/17/2022    CAROTID ENDARTERECTOMY Left 11/17/2022    LEFT TYPE ONE EVERSION CAROTID ENDARTERECTOMY, REIMPLANTATION OF LEFT CAROTID ARTERY performed by Yomaira Wang MD at [facility]      COLONOSCOPY N/A 08/09/2021     LEFT SHOULDER LESION BIOPSY EXCISION X 3 (Left ) RIGHT BACK LESION BIOPSY EXCISION X2 (RIGHT) HEMORROIDECTOMY(N/A) COLORECTAL CANCER SCREENING NOT HIGH RISK    COLONOSCOPY N/A 08/09/2021    COLONOSCOPY WITH BIOPSY performed by Nicholas Merritt DO at [address] [place] [address] N/A 08/09/2021 HEMORRHOIDECTOMY performed by Rl Dotson DO at [address])      SHOULDER SURGERY Left 2021    LEFT SHOULDER LESION BIOPSY EXCISION X 3 performed by Rl Dotson DO at [address] Right 2021    RIGHT BACK LESION BIOPSY EXCISION X 2 performed by Rl Dotson DO at [address] [facility]          Medications Prior to Admission:       Prior to Admission medications    Medication Sig Start Date End Date Taking? Authorizing Provider   atorvastatin (LIPITOR) 20 MG tablet Take 20 mg by mouth daily   Yes Historical Provider, MD   Multiple Vitamins-Minerals (THERAPEUTIC MULTIVITAMIN-MINERALS) tablet Take 1 tablet by mouth daily    Historical Provider, MD   amLODIPine (NORVASC) 5 MG tablet Take 7.5 mg by mouth daily    Historical Provider, MD   aspirin 81 MG EC tablet Take 81 mg by mouth daily    Historical Provider, MD        Allergies:       Penicillins, Monosodium glutamate, Morphine, and Lisinopril    Social History:     Tobacco:    reports that she has quit smoking. She has never used smokeless tobacco.  Alcohol:      reports current alcohol use. Drug Use:  has no history on file for drug use.     Family History:     Family History   Problem Relation Age of Onset    Hypertension Father          Physical Exam:     Vitals:  /73   Pulse 74   Temp 97.6 °F (36.4 °C) (Temporal)   Resp 18   Ht 5' 1\" (1.549 m)   Wt 105 lb 8 oz (47.9 kg)   SpO2 92%   BMI 19.93 kg/m²   Temp (24hrs), Av.9 °F (36.6 °C), Min:97.5 °F (36.4 °C), Max:98.4 °F (36.9 °C)          General appearance - alert, well appearing, and in no acute distress  Mental status - oriented to person, place, and time with normal affect  Head - normocephalic and atraumatic  Eyes - pupils equal and reactive, extraocular eye movements intact, conjunctiva clear  Ears - hearing appears to be intact  Nose - no drainage noted  Mouth - mucous membranes moist  Neck - supple, no carotid bruits, thyroid not palpable  Chest - clear to auscultation, normal effort  Heart - normal rate, regular rhythm, no murmur  Abdomen - soft, nontender, nondistended, bowel sounds present all four quadrants, no masses, hepatomegaly or splenomegaly  Neurological - normal speech, no focal findings or movement disorder noted, cranial nerves II through XII grossly intact  Extremities - peripheral pulses palpable, no pedal edema or calf pain with palpation  Skin - no gross lesions, rashes, or induration noted        Data:     Labs:    Hematology:  Recent Labs     11/18/22  0316   WBC 14.4*   RBC 3.91*   HGB 11.4*   HCT 37.0   MCV 94.6   MCH 29.2   MCHC 30.8   RDW 14.6*      MPV 10.3     Chemistry:  Recent Labs     11/18/22 0316   MG 1.6   PHOS 2.5*     No results for input(s): PROT, LABALBU, LABA1C, A8ZHIJA, N6CLPWD, FT4, TSH, AST, ALT, LDH, GGT, ALKPHOS, LABGGT, BILITOT, BILIDIR, AMMONIA, AMYLASE, LIPASE, LACTATE, CHOL, HDL, LDLCHOLESTEROL, CHOLHDLRATIO, TRIG, VLDL, VTS62UC, PHENYTOIN, PHENYF, URICACID, POCGLU in the last 72 hours. Lab Results   Component Value Date    INR 0.9 11/07/2022    PROTIME 12.4 11/07/2022       No results found for: SPECIAL  Lab Results   Component Value Date/Time    CULTURE NO GROWTH 11/07/2022 10:45 AM       No results found for: POCPH, PHART, PH, POCPCO2, GMZ4TZY, PCO2, POCPO2, PO2ART, PO2, POCHCO3, TZS1JOZ, HCO3, NBEA, PBEA, BEART, BE, THGBART, THB, VAM0VPZ, XERK5QVN, D4BSASHK, O2SAT, FIO2    Radiology:    XR CHEST (2 VW)    Result Date: 11/17/2022  No active pulmonary or pleural disease. Findings consistent with obstructive pulmonary disease and linear fibrotic change.          All radiological studies reviewed                Code Status:  Full Code    Electronically signed by Yahir Trujillo MD on 11/18/2022 at 4:01 PM     Copy sent to Dr. Yung Lou MD    This note was created with the assistance of a speech-recognition program. Although the intention is to generate a document that actually reflects the content of the visit, no guarantees can be provided that every mistake has been identified and corrected by editing. Note was updated later by me after  physical examination and  completion of the assessment.

## 2022-11-18 NOTE — PROGRESS NOTES
End Of Shift Note  [address] CVICU  Summary of shift: Uheventful night. No drips or fluids running. Finishing up last dose of abx. Up to bathroom throughout night. Ambulates without assistive device and with steady gait. Incision well approximated no hematoma, drainage, redness. Dressing intact without drainage. Great u/o.     Vitals:    Vitals:    11/17/22 1800 11/17/22 2000 11/18/22 0000 11/18/22 0400   BP: (!) 117/52 131/65 134/63 138/73   Pulse: 76 70 78 65   Resp:  16 16 18   Temp:  98.2 °F (36.8 °C) 97.5 °F (36.4 °C) 97.7 °F (36.5 °C)   TempSrc:  Temporal Temporal Temporal   SpO2:  95% 91% 93%   Weight:    105 lb 8 oz (47.9 kg)   Height:            I&O:   Intake/Output Summary (Last 24 hours) at 11/18/2022 0655  Last data filed at 11/18/2022 0300  Gross per 24 hour   Intake 965 ml   Output 2050 ml   Net -1085 ml       Resp Status: RA      Critical Care IV infusions:   lactated ringers Stopped (11/18/22 0406)    sodium chloride          LDA:   Peripheral IV 11/17/22 Left;Proximal Forearm (Active)   Number of days: 0       Peripheral IV 11/17/22 Right Hand (Active)   Number of days: 0       Incision 11/17/22 Neck Left (Active)   Number of days: 1

## 2022-11-18 NOTE — FLOWSHEET NOTE
11/18/22 0900   Treatment Team Notification   Reason for Communication Review case   Team Member Name Dr Lopez Vance Team Role Consulting Provider   Method of Communication Call   Response See orders     MD called unit to check on consult, updated by RN. Verbal order received to do 12 lead EKG. Will round later this afternoon.

## 2022-11-18 NOTE — PROGRESS NOTES
Pt given discharge instructions regarding follow-up, medications, self care, activity. Pt voices understanding of same. Discharged per wheelchair with all belongings in care of son.

## 2022-11-19 LAB
EKG ATRIAL RATE: 77 BPM
EKG P AXIS: 77 DEGREES
EKG P-R INTERVAL: 166 MS
EKG Q-T INTERVAL: 372 MS
EKG QRS DURATION: 84 MS
EKG QTC CALCULATION (BAZETT): 420 MS
EKG R AXIS: 52 DEGREES
EKG T AXIS: 70 DEGREES
EKG VENTRICULAR RATE: 77 BPM

## 2022-11-19 PROCEDURE — 93010 ELECTROCARDIOGRAM REPORT: CPT | Performed by: INTERNAL MEDICINE

## 2022-11-21 LAB — SURGICAL PATHOLOGY REPORT: NORMAL

## 2023-02-01 ENCOUNTER — HOSPITAL ENCOUNTER (OUTPATIENT)
Dept: GENERAL RADIOLOGY | Age: 88
Discharge: HOME | End: 2023-02-03
Payer: MEDICARE

## 2023-02-01 ENCOUNTER — HOSPITAL ENCOUNTER (OUTPATIENT)
Dept: CT IMAGING | Age: 88
Discharge: HOME | End: 2023-02-03
Payer: MEDICARE

## 2023-02-01 VITALS
HEART RATE: 78 BPM | TEMPERATURE: 97.7 F | RESPIRATION RATE: 20 BRPM | OXYGEN SATURATION: 93 % | SYSTOLIC BLOOD PRESSURE: 100 MMHG | DIASTOLIC BLOOD PRESSURE: 56 MMHG | WEIGHT: 113 LBS | HEIGHT: 62 IN | BODY MASS INDEX: 20.8 KG/M2

## 2023-02-01 DIAGNOSIS — R91.8 LUNG MASS: ICD-10-CM

## 2023-02-01 LAB
INR PPP: 0.9
PLATELET # BLD AUTO: 343 K/UL (ref 138–453)
POTASSIUM SERPL-SCNC: 3.9 MMOL/L (ref 3.7–5.3)
PROTHROMBIN TIME: 12.4 SEC (ref 11.5–14.2)

## 2023-02-01 PROCEDURE — 84132 ASSAY OF SERUM POTASSIUM: CPT

## 2023-02-01 PROCEDURE — 88342 IMHCHEM/IMCYTCHM 1ST ANTB: CPT

## 2023-02-01 PROCEDURE — 71045 X-RAY EXAM CHEST 1 VIEW: CPT

## 2023-02-01 PROCEDURE — 88305 TISSUE EXAM BY PATHOLOGIST: CPT

## 2023-02-01 PROCEDURE — 77012 CT SCAN FOR NEEDLE BIOPSY: CPT

## 2023-02-01 PROCEDURE — 88341 IMHCHEM/IMCYTCHM EA ADD ANTB: CPT

## 2023-02-01 PROCEDURE — 2580000003 HC RX 258: Performed by: RADIOLOGY

## 2023-02-01 PROCEDURE — 6360000002 HC RX W HCPCS: Performed by: RADIOLOGY

## 2023-02-01 PROCEDURE — 88333 PATH CONSLTJ SURG CYTO XM 1: CPT

## 2023-02-01 PROCEDURE — 2709999900 CT NEEDLE BIOPSY LUNG PERCUTANEOUS W IMAGING GUIDANCE

## 2023-02-01 PROCEDURE — 88334 PATH CONSLTJ SURG CYTO XM EA: CPT

## 2023-02-01 PROCEDURE — 85610 PROTHROMBIN TIME: CPT

## 2023-02-01 PROCEDURE — 36415 COLL VENOUS BLD VENIPUNCTURE: CPT

## 2023-02-01 PROCEDURE — 7100000010 HC PHASE II RECOVERY - FIRST 15 MIN

## 2023-02-01 PROCEDURE — 7100000011 HC PHASE II RECOVERY - ADDTL 15 MIN

## 2023-02-01 PROCEDURE — 85049 AUTOMATED PLATELET COUNT: CPT

## 2023-02-01 RX ORDER — FENTANYL CITRATE 0.05 MG/ML
INJECTION, SOLUTION INTRAMUSCULAR; INTRAVENOUS
Status: COMPLETED | OUTPATIENT
Start: 2023-02-01 | End: 2023-02-01

## 2023-02-01 RX ORDER — MIDAZOLAM HYDROCHLORIDE 1 MG/ML
INJECTION INTRAMUSCULAR; INTRAVENOUS
Status: COMPLETED | OUTPATIENT
Start: 2023-02-01 | End: 2023-02-01

## 2023-02-01 RX ORDER — SODIUM CHLORIDE 0.9 % (FLUSH) 0.9 %
5-40 SYRINGE (ML) INJECTION EVERY 12 HOURS SCHEDULED
Status: DISCONTINUED | OUTPATIENT
Start: 2023-02-01 | End: 2023-02-04 | Stop reason: HOSPADM

## 2023-02-01 RX ORDER — SODIUM CHLORIDE 9 MG/ML
INJECTION, SOLUTION INTRAVENOUS PRN
Status: DISCONTINUED | OUTPATIENT
Start: 2023-02-01 | End: 2023-02-04 | Stop reason: HOSPADM

## 2023-02-01 RX ORDER — SODIUM CHLORIDE 9 MG/ML
INJECTION, SOLUTION INTRAVENOUS CONTINUOUS
Status: DISCONTINUED | OUTPATIENT
Start: 2023-02-01 | End: 2023-02-04 | Stop reason: HOSPADM

## 2023-02-01 RX ORDER — SODIUM CHLORIDE 0.9 % (FLUSH) 0.9 %
5-40 SYRINGE (ML) INJECTION PRN
Status: DISCONTINUED | OUTPATIENT
Start: 2023-02-01 | End: 2023-02-04 | Stop reason: HOSPADM

## 2023-02-01 RX ORDER — ACETAMINOPHEN 325 MG/1
650 TABLET ORAL EVERY 4 HOURS PRN
Status: DISCONTINUED | OUTPATIENT
Start: 2023-02-01 | End: 2023-02-04 | Stop reason: HOSPADM

## 2023-02-01 RX ADMIN — FENTANYL CITRATE 50 MCG: 0.05 INJECTION, SOLUTION INTRAMUSCULAR; INTRAVENOUS at 10:49

## 2023-02-01 RX ADMIN — MIDAZOLAM 0.5 MG: 1 INJECTION INTRAMUSCULAR; INTRAVENOUS at 10:49

## 2023-02-01 RX ADMIN — SODIUM CHLORIDE: 9 INJECTION, SOLUTION INTRAVENOUS at 09:06

## 2023-02-01 ASSESSMENT — PAIN - FUNCTIONAL ASSESSMENT: PAIN_FUNCTIONAL_ASSESSMENT: 0-10

## 2023-02-01 NOTE — BRIEF OP NOTE
Brief Postoperative Note    Aliyah Cabrera  YOB: 1935  [id number]    Pre-operative Diagnosis: Spiculated RLL nodule    Post-operative Diagnosis: Same    Procedure: CT guided RLL nodule core biopsy    Anesthesia: Moderate Sedation    Surgeons/Assistants: Ayden    Estimated Blood Loss: less than 50     Complications: Other: small-moderate parenchymal hemorrhage; tin apical pneumothorax    Specimens: Was Obtained: 3 core samples using 20 G needle    Addendum:  Saw patient by bedside around 1400 hours. In NAD, sitting up with her son. She had tolerated a diet. Complains of minimal chest discomfort when taking deep breaths. No other complaints. I educated patient and her son about the small stable pneumothorax. Patient had tolerated walking around the recovery room without need of supplemental oxygen. She is desiring to go home. She understands that if there is any clinical change SOB or chest she will need to call 911 or go to the nearest ED.     Electronically signed by Geena Newby MD on 2/1/2023 at 11:38 AM

## 2023-02-01 NOTE — FLOWSHEET NOTE
Patient here for lung bx. Patient tolerated procedure well vitals stable and charted. Xray obtained and to be reviewed per Dr Herman Aguila. Patient to PACU.

## 2023-02-01 NOTE — POST SEDATION
Sedation Post Procedure Note    Patient Name: Beth Quinones   YOB: 1935  Room/Bed: Room/bed info not found  Medical Record Number: [id number]  Date: 2/1/2023   Time: 11:34 AM         Physicians/Assistants: Juan Cerda MD, MD    Procedure Performed:  CT guided RLL nodule core biopsy    Post-Sedation Vital Signs:  Vitals:    02/01/23 1120   BP: 130/77   Pulse: 80   Resp: 18   Temp:    SpO2: 98%      Vital signs were reviewed and were stable after the procedure (see flow sheet for vitals)            Complications: Small-moderate parenchymal hemmorrhage; tiny right apical pneumothorax    Electronically signed by Juan Cerda MD on 2/1/2023 at 11:34 AM

## 2023-02-01 NOTE — PRE SEDATION
Sedation Pre-Procedure Note    Patient Name: Shantal Alcala   YOB: 1935  Room/Bed: Room/bed info not found  Medical Record Number: [id number]  Date: 2/1/2023   Time: 10:08 AM       Indication:  RLL pulmonary nodule    Consent: I have discussed with the patient and/or the patient representative the indication, alternatives, and the possible risks and/or complications of the planned procedure and the anesthesia methods. The patient and/or patient representative appear to understand and agree to proceed. Vital Signs:   Vitals:    02/01/23 0844   BP: (!) 154/59   Pulse: 72   Resp: 16   Temp: 96.8 °F (36 °C)   SpO2: 96%       Past Medical History:   has a past medical history of Bilateral carotid artery disease (HCC), Elevated hemoglobin ([address] Hx of basal cell carcinoma, Hyperlipidemia, Hypertension, Low blood sugar, Mild depression, and Thyroid nodule. Past Surgical History:   has a past surgical history that includes Appendectomy; Tonsillectomy; Hysterectomy; Cholecystectomy; Breast surgery; Colonoscopy (N/A, 08/09/2021); shoulder surgery (Left, 08/09/2021); skin biopsy (Right, 08/09/2021); Hemorrhoid surgery (N/A, 08/09/2021); Colonoscopy (N/A, 08/09/2021); Carotid endarterectomy (Left, 11/17/2022); and Carotid endarterectomy (Left, 11/17/2022). Medications:   Scheduled Meds:    sodium chloride flush  5-40 mL IntraVENous 2 times per day     Continuous Infusions:    sodium chloride 25 mL/hr at 02/01/23 0906     PRN Meds: sodium chloride flush, sodium chloride  Home Meds:   Prior to Admission medications    Medication Sig Start Date End Date Taking?  Authorizing Provider   atorvastatin (LIPITOR) 20 MG tablet Take 20 mg by mouth daily    Historical Provider, MD   Multiple Vitamins-Minerals (THERAPEUTIC MULTIVITAMIN-MINERALS) tablet Take 1 tablet by mouth daily    Historical Provider, MD   amLODIPine (NORVASC) 5 MG tablet Take 7.5 mg by mouth daily    Historical Provider, MD   aspirin 81 MG EC tablet Take 81 mg by mouth daily    Historical Provider, MD     Coumadin Use Last 7 Days:  no  Antiplatelet drug therapy use last 7 days: no  Other anticoagulant use last 7 days: no  Additional Medication Information:  see med [facility]      Pre-Sedation Documentation and Exam:   I have reviewed the patient's history and review of systems.     Mallampati Airway Assessment:  Mallampati Class II - (soft palate, fauces & uvula are visible)    Prior History of Anesthesia Complications:   none    ASA Classification:  Class 2 - A normal healthy patient with mild systemic disease    Sedation/ Anesthesia Plan:   intravenous sedation    Medications Planned:   midazolam (Versed) intravenously and fentanyl intravenously    Patient is an appropriate candidate for plan of sedation: yes    Electronically signed by Juan Cerda MD on 2/1/2023 at 10:08 AM

## 2023-02-01 NOTE — H&P
History and Physical Service   [facility]    HISTORY AND PHYSICAL EXAMINATION            Date of Evaluation: 2/1/2023  Patient name:  Nicola Lopez  MRN:   [id number]  YOB: 1935  PCP:    Crissy Chester MD    History Obtained From:     Patient, medical records    History of Present Illness: This is Nicola Lopez a 82 y.o. female who presents today for a CT GUIDED BIOPSY OF HER RIGHT LUNG  by DR REED FOR EVALUATION OF A LUNG LESION. THE PATIENT DENIES ANY CHEST PAIN ,COUGHING OR HEMOPTYSIS . Denies fever, chills, shortness of breath, cough, congestion, wheezing, chest pain, open sores or wounds. SHE STATES THAT SHE TAKES ASA 81 MG, LAST DOSE WAS Thursday 1/26/2023 . HUGO DOES NOT HAVE DIABETES.     Past Medical History:     Past Medical History:   Diagnosis Date    Bilateral carotid artery disease (HCC)     Elevated hemoglobin (HCC)     Hx of basal cell carcinoma     Hyperlipidemia     Hypertension     Low blood sugar     Mild depression     Thyroid nodule         Past Surgical History:     Past Surgical History:   Procedure Laterality Date    APPENDECTOMY      BREAST SURGERY      I&D breast abscess age 8    CAROTID ENDARTERECTOMY Left 11/17/2022    CAROTID ENDARTERECTOMY Left 11/17/2022    LEFT TYPE ONE EVERSION CAROTID ENDARTERECTOMY, REIMPLANTATION OF LEFT CAROTID ARTERY performed by Samuel Renee MD at [facility]      COLONOSCOPY N/A 08/09/2021     LEFT SHOULDER LESION BIOPSY EXCISION X 3 (Left ) RIGHT BACK LESION BIOPSY EXCISION X2 (RIGHT) HEMORROIDECTOMY(N/A) COLORECTAL CANCER SCREENING NOT HIGH RISK    COLONOSCOPY N/A 08/09/2021    COLONOSCOPY WITH BIOPSY performed by Ashleigh Aranda DO at [address] [place] [address] N/A 08/09/2021    HEMORRHOIDECTOMY performed by Ashleigh Aranda DO at [facility] (CERVIX STATUS UNKNOWN)      SHOULDER SURGERY Left 08/09/2021    LEFT SHOULDER LESION BIOPSY EXCISION X 3 performed by Reddy Faria DO at [address] Right 08/09/2021    RIGHT BACK LESION BIOPSY EXCISION X 2 performed by Reddy Faria DO at [address]          Medications Prior to Admission:     Prior to Admission medications    Medication Sig Start Date End Date Taking? Authorizing Provider   atorvastatin (LIPITOR) 20 MG tablet Take 20 mg by mouth daily    Historical Provider, MD   Multiple Vitamins-Minerals (THERAPEUTIC MULTIVITAMIN-MINERALS) tablet Take 1 tablet by mouth daily    Historical Provider, MD   amLODIPine (NORVASC) 5 MG tablet Take 7.5 mg by mouth daily    Historical Provider, MD   aspirin 81 MG EC tablet Take 81 mg by mouth daily    Historical Provider, MD        Allergies:     Penicillins, Food, Monosodium glutamate, Morphine, and Lisinopril    Social History:     Tobacco:    reports that she quit smoking about 4 months ago. Her smoking use included cigarettes. She has never used smokeless tobacco.  Alcohol:      reports current alcohol use. Drug Use:  has no history on file for drug use. Family History:     Family History   Problem Relation Age of Onset    Hypertension Father        Review of Systems:     Positive and Negative as described in HPI. CONSTITUTIONAL  negative for fevers, chills, sweats, fatigue, weight loss  HEENT: WEARS GLASSES  for vision, NO hearing changes, runny nose, throat pain  RESPIRATORY:  negative for shortness of breath, cough, congestion, wheezing. DIAGNOSED WITH COPD BY    CARDIOVASCULAR: HAS HYPERTENSION  negative for chest pain, palpitations. HAD RECENT CAROTID ENDARTERECTOMY WITH RE-IMPLANTATION.    GASTROINTESTINAL:  negative for nausea, vomiting, diarrhea, constipation, change in bowel habits, abdominal pain   GENITOURINARY:  negative for difficulty of urination, burning with urination, frequency   INTEGUMENT:  negative for rash, skin lesions, easy bruising   HEMATOLOGIC/LYMPHATIC:  negative for swelling/edema   ALLERGIC/IMMUNOLOGIC:  negative for urticaria , itching  ENDOCRINE:  negative increase in drinking, increase in urination, hot or cold intolerance  MUSCULOSKELETAL: HAS ARTHRITIC joint pains, muscle aches, swelling of joints  NEUROLOGICAL:  negative for headaches, dizziness, lightheadedness, numbness, pain, tingling extremities  BEHAVIOR/PSYCH:  negative for depression, anxiety    Physical Exam:   BP (!) 154/59   Pulse 72   Temp 96.8 °F (36 °C) (Temporal)   Resp 16   Ht 5' 2\" (1.575 m)   Wt 113 lb (51.3 kg)   SpO2 96%   BMI 20.67 kg/m²   No LMP recorded. Patient has had a hysterectomy. No obstetric history on file. No results for input(s): POCGLU in the last 72 hours. General Appearance:  alert, well appearing, and in no acute distress  Mental status: oriented to person, place, and time with normal affect  Head:  normocephalic, atraumatic. Eye: no icterus, redness, pupils equal and reactive, extraocular eye movements intact, conjunctiva clear  Ear: normal external ear, no discharge, hearing intact  Nose:  no drainage noted  Mouth: mucous membranes moist  Neck: supple, no carotid bruits, thyroid not palpable  Lungs: DIMINISHED   air entry, RIGHT LUNG, NORMAL AIR ENTRY LEFT LUNG. clear to ausculation, no wheezing, rales or rhonchi, normal effort  Cardiovascular: HR  normal rate, IRREGULARLY IRREGULAR  rhythm, no murmur, gallop, rub.   Abdomen: Soft, nontender, nondistended, normal bowel sounds  Neurologic: There are no new focal motor or sensory deficits, normal muscle tone and bulk, no abnormal sensation, normal speech, cranial nerves II through XII grossly intact  Skin: No gross lesions, rashes, bruising or bleeding on exposed skin area  Extremities:  peripheral pulses palpable, no pedal edema or calf pain with palpation  Psych: normal affect     Investigations:      Laboratory Testing:  Recent Results (from the past 24 hour(s))   Platelet Count    Collection Time: 02/01/23  9:15 AM Result Value Ref Range    Platelets 272 384 - 567 k/uL   Protime-INR    Collection Time: 02/01/23  9:15 AM   Result Value Ref Range    Protime 12.4 11.5 - 14.2 sec    INR 0.9    Potassium    Collection Time: 02/01/23  9:15 AM   Result Value Ref Range    Potassium 3.9 3.7 - 5.3 mmol/L       Recent Labs     02/01/23  0915   K 3.9   INR 0.9   PROTIME 12.4       No results for input(s): COVID19 in the last 720 hours. Imaging/Diagnostics:    No results found. SEE Epic     Diagnosis:    LUNG LESION   Plans:     1. CT GUIDED PERCUTANEOUS LUNG BIOPSY RIGHT LOWER LOBE.       Kavin Wetzel, KATTY - CNP  2/1/2023  9:41 AM

## 2023-02-01 NOTE — PROGRESS NOTES
Dr Maribell Orlando to see patient, patient walked aroun the pacu department 3 times , denies sob , no increase in pain

## 2023-02-07 ENCOUNTER — TELEPHONE (OUTPATIENT)
Dept: CASE MANAGEMENT | Age: 88
End: 2023-02-07

## 2023-02-07 NOTE — TELEPHONE ENCOUNTER
Faxed report and left message at office to check on status of appt, make sure they got report, and fax note if already seen.

## 2023-02-07 NOTE — TELEPHONE ENCOUNTER
Name: Erik Keating  : 1935  MRN: [id number]    Oncology Navigation- Initial Note:  (Unknown)  Navigator received assignment and reviewing chart now, pt. Follows with Dr. Hali Dinh.    Lubna Berger, please fax note and results to Dr. Jessica Davis office and check on F/U please  ThanksDawn    Electronically signed by Param Phma RN on 2023 at 9:38 AM

## 2023-02-15 ENCOUNTER — TELEPHONE (OUTPATIENT)
Dept: CASE MANAGEMENT | Age: 88
End: 2023-02-15

## 2023-02-15 NOTE — TELEPHONE ENCOUNTER
Name: Jose Raul Berger  : 1935  MRN: [id number]    Oncology Navigation- Initial Note:  Navigator reaching out to pt and pt. Following with Dr. Татьяна SANTOYO.    Electronically signed by Paul Bundy RN on 2/15/2023 at 3:11 PM

## 2023-05-16 NOTE — PROGRESS NOTES
DAY OF SURGERY/PROCEDURE  GUIDELINES    As a patient at the [facility], you can expect quality medical and nursing care that is centered on your individual needs. It is our goal to make your surgical experience as comfortable and excellent as possible.  ________________________________________________________________________    The following instructions are general guidelines, if any information on this sheet is different from what your doctor has instructed you to do, please follow your doctor's instructions. Please arrive on 5/19      Enter through entrance C. Check in at registration     Upon arrival you will be taken to the pre-operative area to get ready for surgery, your family will stay in the waiting room and visit with you once you are ready for surgery. Due to special limitations please limit visitation to 1-2 members of your family at a time. When it is time for surgery your family will return to the waiting room. Nothing to eat, drink, smoke, suck or chew after midnight (no water, gum, mints, cigarettes, cigars, pipes, snuff, chewing tobacco, etc.) or your surgery may be canceled. Take a shower or bath on the morning of your surgery/procedure (Hibiclens if directed) Do not apply any lotions. Brush your teeth, but do not swallow any water    IN CASE OF ILLNESS - If you have a cold or flu symptoms (high fever, runny nose, sore throat, cough, etc.) rash, nausea, vomiting, loose stools, and/or recent contact with someone who has a contagious disease (chick pox, measles, etc.) please call your doctor before coming to the surgery center    Take a small sip of water with heart, blood pressure, and/or seizure medication the morning of surgery.  amlodipine    If applicable bring your:  Inhaler (s)  Hearing aid(s)  Eyeglasses and Case (If you wear contacts they have to be removed before surgery, bring case and solution)    DO NOT take anticoagulants (blood thinners, aspirin or

## 2023-05-17 ENCOUNTER — HOSPITAL ENCOUNTER (OUTPATIENT)
Age: 88
Discharge: HOME | End: 2023-05-17
Payer: MEDICARE

## 2023-05-17 DIAGNOSIS — Z01.818 PRE-OP TESTING: ICD-10-CM

## 2023-05-17 LAB
ANION GAP SERPL CALCULATED.3IONS-SCNC: 12 MMOL/L (ref 9–17)
BUN SERPL-MCNC: 11 MG/DL (ref 8–23)
CALCIUM SERPL-MCNC: 10.1 MG/DL (ref 8.6–10.4)
CHLORIDE SERPL-SCNC: 104 MMOL/L (ref 98–107)
CO2 SERPL-SCNC: 25 MMOL/L (ref 20–31)
CREAT SERPL-MCNC: 0.74 MG/DL (ref 0.5–0.9)
ERYTHROCYTE [DISTWIDTH] IN BLOOD BY AUTOMATED COUNT: 14.6 % (ref 11.8–14.4)
GFR SERPL CREATININE-BSD FRML MDRD: >60 ML/MIN/1.73M2
GLUCOSE SERPL-MCNC: 88 MG/DL (ref 70–99)
HCT VFR BLD AUTO: 46.7 % (ref 36.3–47.1)
HGB BLD-MCNC: 14.5 G/DL (ref 11.9–15.1)
MCH RBC QN AUTO: 28.7 PG (ref 25.2–33.5)
MCHC RBC AUTO-ENTMCNC: 31 G/DL (ref 28.4–34.8)
MCV RBC AUTO: 92.5 FL (ref 82.6–102.9)
NRBC AUTOMATED: 0 PER 100 WBC
PLATELET # BLD AUTO: 342 K/UL (ref 138–453)
PMV BLD AUTO: 10.7 FL (ref 8.1–13.5)
POTASSIUM SERPL-SCNC: 4.1 MMOL/L (ref 3.7–5.3)
RBC # BLD AUTO: 5.05 M/UL (ref 3.95–5.11)
SODIUM SERPL-SCNC: 141 MMOL/L (ref 135–144)
WBC OTHER # BLD: 7.6 K/UL (ref 3.5–11.3)

## 2023-05-17 PROCEDURE — 36415 COLL VENOUS BLD VENIPUNCTURE: CPT

## 2023-05-17 PROCEDURE — 85027 COMPLETE CBC AUTOMATED: CPT

## 2023-05-17 PROCEDURE — 80048 BASIC METABOLIC PNL TOTAL CA: CPT

## 2023-05-17 PROCEDURE — 93005 ELECTROCARDIOGRAM TRACING: CPT | Performed by: ANESTHESIOLOGY

## 2023-05-18 ENCOUNTER — ANESTHESIA EVENT (OUTPATIENT)
Dept: OPERATING ROOM | Age: 88
End: 2023-05-18
Payer: MEDICARE

## 2023-05-18 LAB
EKG ATRIAL RATE: 69 BPM
EKG P AXIS: 72 DEGREES
EKG P-R INTERVAL: 160 MS
EKG Q-T INTERVAL: 400 MS
EKG QRS DURATION: 84 MS
EKG QTC CALCULATION (BAZETT): 428 MS
EKG R AXIS: 26 DEGREES
EKG T AXIS: 56 DEGREES
EKG VENTRICULAR RATE: 69 BPM

## 2023-05-19 ENCOUNTER — HOSPITAL ENCOUNTER (OUTPATIENT)
Age: 88
Setting detail: OUTPATIENT SURGERY
Discharge: HOME | End: 2023-05-19
Attending: SURGERY | Admitting: SURGERY
Payer: MEDICARE

## 2023-05-19 ENCOUNTER — ANESTHESIA (OUTPATIENT)
Dept: OPERATING ROOM | Age: 88
End: 2023-05-19
Payer: MEDICARE

## 2023-05-19 VITALS
DIASTOLIC BLOOD PRESSURE: 76 MMHG | HEIGHT: 61 IN | SYSTOLIC BLOOD PRESSURE: 150 MMHG | HEART RATE: 72 BPM | OXYGEN SATURATION: 96 % | WEIGHT: 113.4 LBS | RESPIRATION RATE: 14 BRPM | BODY MASS INDEX: 21.41 KG/M2 | TEMPERATURE: 97.3 F

## 2023-05-19 DIAGNOSIS — G89.18 POST-OP PAIN: ICD-10-CM

## 2023-05-19 DIAGNOSIS — Z01.818 PRE-OP TESTING: Primary | ICD-10-CM

## 2023-05-19 DIAGNOSIS — D48.5 NEOPLASM OF UNCERTAIN BEHAVIOR OF SKIN: ICD-10-CM

## 2023-05-19 PROCEDURE — 2500000003 HC RX 250 WO HCPCS: Performed by: NURSE ANESTHETIST, CERTIFIED REGISTERED

## 2023-05-19 PROCEDURE — 7100000011 HC PHASE II RECOVERY - ADDTL 15 MIN: Performed by: SURGERY

## 2023-05-19 PROCEDURE — 3600000012 HC SURGERY LEVEL 2 ADDTL 15MIN: Performed by: SURGERY

## 2023-05-19 PROCEDURE — 7100000001 HC PACU RECOVERY - ADDTL 15 MIN: Performed by: SURGERY

## 2023-05-19 PROCEDURE — 2709999900 HC NON-CHARGEABLE SUPPLY: Performed by: SURGERY

## 2023-05-19 PROCEDURE — 6360000002 HC RX W HCPCS

## 2023-05-19 PROCEDURE — 7100000000 HC PACU RECOVERY - FIRST 15 MIN: Performed by: SURGERY

## 2023-05-19 PROCEDURE — 3700000000 HC ANESTHESIA ATTENDED CARE: Performed by: SURGERY

## 2023-05-19 PROCEDURE — 2500000003 HC RX 250 WO HCPCS: Performed by: SURGERY

## 2023-05-19 PROCEDURE — 2580000003 HC RX 258: Performed by: ANESTHESIOLOGY

## 2023-05-19 PROCEDURE — 7100000010 HC PHASE II RECOVERY - FIRST 15 MIN: Performed by: SURGERY

## 2023-05-19 PROCEDURE — 88305 TISSUE EXAM BY PATHOLOGIST: CPT

## 2023-05-19 PROCEDURE — 3700000001 HC ADD 15 MINUTES (ANESTHESIA): Performed by: SURGERY

## 2023-05-19 PROCEDURE — 6360000002 HC RX W HCPCS: Performed by: NURSE ANESTHETIST, CERTIFIED REGISTERED

## 2023-05-19 PROCEDURE — 3600000002 HC SURGERY LEVEL 2 BASE: Performed by: SURGERY

## 2023-05-19 RX ORDER — SODIUM CHLORIDE 9 MG/ML
INJECTION, SOLUTION INTRAVENOUS PRN
Status: DISCONTINUED | OUTPATIENT
Start: 2023-05-19 | End: 2023-05-19 | Stop reason: HOSPADM

## 2023-05-19 RX ORDER — LIDOCAINE HYDROCHLORIDE 10 MG/ML
INJECTION, SOLUTION INFILTRATION; PERINEURAL PRN
Status: DISCONTINUED | OUTPATIENT
Start: 2023-05-19 | End: 2023-05-19 | Stop reason: SDUPTHER

## 2023-05-19 RX ORDER — OXYCODONE HYDROCHLORIDE AND ACETAMINOPHEN 5; 325 MG/1; MG/1
1 TABLET ORAL EVERY 8 HOURS PRN
Qty: 10 TABLET | Refills: 0 | Status: SHIPPED | OUTPATIENT
Start: 2023-05-19 | End: 2023-05-22

## 2023-05-19 RX ORDER — SODIUM CHLORIDE, SODIUM LACTATE, POTASSIUM CHLORIDE, CALCIUM CHLORIDE 600; 310; 30; 20 MG/100ML; MG/100ML; MG/100ML; MG/100ML
INJECTION, SOLUTION INTRAVENOUS CONTINUOUS
Status: DISCONTINUED | OUTPATIENT
Start: 2023-05-19 | End: 2023-05-19 | Stop reason: HOSPADM

## 2023-05-19 RX ORDER — SODIUM CHLORIDE 0.9 % (FLUSH) 0.9 %
5-40 SYRINGE (ML) INJECTION PRN
Status: DISCONTINUED | OUTPATIENT
Start: 2023-05-19 | End: 2023-05-19 | Stop reason: HOSPADM

## 2023-05-19 RX ORDER — VANCOMYCIN HYDROCHLORIDE 1 G/20ML
INJECTION, POWDER, LYOPHILIZED, FOR SOLUTION INTRAVENOUS
Status: COMPLETED
Start: 2023-05-19 | End: 2023-05-19

## 2023-05-19 RX ORDER — OXYCODONE HYDROCHLORIDE 5 MG/1
5 TABLET ORAL PRN
Status: DISCONTINUED | OUTPATIENT
Start: 2023-05-19 | End: 2023-05-19 | Stop reason: HOSPADM

## 2023-05-19 RX ORDER — SODIUM CHLORIDE 0.9 % (FLUSH) 0.9 %
5-40 SYRINGE (ML) INJECTION EVERY 12 HOURS SCHEDULED
Status: DISCONTINUED | OUTPATIENT
Start: 2023-05-19 | End: 2023-05-19 | Stop reason: HOSPADM

## 2023-05-19 RX ORDER — DIPHENHYDRAMINE HYDROCHLORIDE 50 MG/ML
12.5 INJECTION INTRAMUSCULAR; INTRAVENOUS
Status: DISCONTINUED | OUTPATIENT
Start: 2023-05-19 | End: 2023-05-19 | Stop reason: HOSPADM

## 2023-05-19 RX ORDER — BUPIVACAINE HYDROCHLORIDE AND EPINEPHRINE 5; 5 MG/ML; UG/ML
INJECTION, SOLUTION EPIDURAL; INTRACAUDAL; PERINEURAL
Status: DISCONTINUED
Start: 2023-05-19 | End: 2023-05-19 | Stop reason: HOSPADM

## 2023-05-19 RX ORDER — DEXAMETHASONE SODIUM PHOSPHATE 10 MG/ML
INJECTION, SOLUTION INTRAMUSCULAR; INTRAVENOUS PRN
Status: DISCONTINUED | OUTPATIENT
Start: 2023-05-19 | End: 2023-05-19 | Stop reason: SDUPTHER

## 2023-05-19 RX ORDER — METOCLOPRAMIDE HYDROCHLORIDE 5 MG/ML
10 INJECTION INTRAMUSCULAR; INTRAVENOUS
Status: DISCONTINUED | OUTPATIENT
Start: 2023-05-19 | End: 2023-05-19 | Stop reason: HOSPADM

## 2023-05-19 RX ORDER — PHENYLEPHRINE HCL IN 0.9% NACL 1 MG/10 ML
SYRINGE (ML) INTRAVENOUS PRN
Status: DISCONTINUED | OUTPATIENT
Start: 2023-05-19 | End: 2023-05-19 | Stop reason: SDUPTHER

## 2023-05-19 RX ORDER — HYDRALAZINE HYDROCHLORIDE 20 MG/ML
10 INJECTION INTRAMUSCULAR; INTRAVENOUS
Status: DISCONTINUED | OUTPATIENT
Start: 2023-05-19 | End: 2023-05-19 | Stop reason: HOSPADM

## 2023-05-19 RX ORDER — SODIUM CHLORIDE 9 MG/ML
25 INJECTION, SOLUTION INTRAVENOUS PRN
Status: DISCONTINUED | OUTPATIENT
Start: 2023-05-19 | End: 2023-05-19 | Stop reason: HOSPADM

## 2023-05-19 RX ORDER — PROPOFOL 10 MG/ML
INJECTION, EMULSION INTRAVENOUS PRN
Status: DISCONTINUED | OUTPATIENT
Start: 2023-05-19 | End: 2023-05-19 | Stop reason: SDUPTHER

## 2023-05-19 RX ORDER — FENTANYL CITRATE 50 UG/ML
INJECTION, SOLUTION INTRAMUSCULAR; INTRAVENOUS PRN
Status: DISCONTINUED | OUTPATIENT
Start: 2023-05-19 | End: 2023-05-19 | Stop reason: SDUPTHER

## 2023-05-19 RX ORDER — BUPIVACAINE HYDROCHLORIDE AND EPINEPHRINE 5; 5 MG/ML; UG/ML
INJECTION, SOLUTION PERINEURAL PRN
Status: DISCONTINUED | OUTPATIENT
Start: 2023-05-19 | End: 2023-05-19 | Stop reason: ALTCHOICE

## 2023-05-19 RX ORDER — LIDOCAINE HYDROCHLORIDE 10 MG/ML
1 INJECTION, SOLUTION INFILTRATION; PERINEURAL
Status: DISCONTINUED | OUTPATIENT
Start: 2023-05-19 | End: 2023-05-19 | Stop reason: HOSPADM

## 2023-05-19 RX ORDER — ONDANSETRON 2 MG/ML
4 INJECTION INTRAMUSCULAR; INTRAVENOUS
Status: DISCONTINUED | OUTPATIENT
Start: 2023-05-19 | End: 2023-05-19 | Stop reason: HOSPADM

## 2023-05-19 RX ORDER — OXYCODONE HYDROCHLORIDE 5 MG/1
10 TABLET ORAL PRN
Status: DISCONTINUED | OUTPATIENT
Start: 2023-05-19 | End: 2023-05-19 | Stop reason: HOSPADM

## 2023-05-19 RX ADMIN — DEXAMETHASONE SODIUM PHOSPHATE 10 MG: 10 INJECTION INTRAMUSCULAR; INTRAVENOUS at 15:21

## 2023-05-19 RX ADMIN — Medication 100 MCG: at 15:43

## 2023-05-19 RX ADMIN — FENTANYL CITRATE 25 MCG: 50 INJECTION, SOLUTION INTRAMUSCULAR; INTRAVENOUS at 14:54

## 2023-05-19 RX ADMIN — VANCOMYCIN HYDROCHLORIDE: 1 INJECTION, POWDER, LYOPHILIZED, FOR SOLUTION INTRAVENOUS at 14:45

## 2023-05-19 RX ADMIN — SODIUM CHLORIDE, POTASSIUM CHLORIDE, SODIUM LACTATE AND CALCIUM CHLORIDE: 600; 310; 30; 20 INJECTION, SOLUTION INTRAVENOUS at 14:52

## 2023-05-19 RX ADMIN — LIDOCAINE HYDROCHLORIDE 40 MG: 10 INJECTION, SOLUTION INFILTRATION; PERINEURAL at 14:53

## 2023-05-19 RX ADMIN — Medication 100 MCG: at 15:13

## 2023-05-19 RX ADMIN — Medication 100 MCG: at 15:17

## 2023-05-19 RX ADMIN — Medication 100 MCG: at 15:15

## 2023-05-19 RX ADMIN — Medication 100 MCG: at 15:26

## 2023-05-19 RX ADMIN — Medication 100 MCG: at 15:40

## 2023-05-19 RX ADMIN — Medication 100 MCG: at 15:28

## 2023-05-19 RX ADMIN — FENTANYL CITRATE 75 MCG: 50 INJECTION, SOLUTION INTRAMUSCULAR; INTRAVENOUS at 15:52

## 2023-05-19 RX ADMIN — PROPOFOL 200 MG: 10 INJECTION, EMULSION INTRAVENOUS at 14:54

## 2023-05-19 ASSESSMENT — PAIN - FUNCTIONAL ASSESSMENT: PAIN_FUNCTIONAL_ASSESSMENT: 0-10

## 2023-05-19 NOTE — DISCHARGE INSTRUCTIONS
You have had lesions removed today under general anesthesia. You may have some pain at the surgery site after the procedure. You should avoid aspirin products and over the counter products  for 3 days. Keep areas clean and dry. No strenuous activity for 24  HOURS     No swimming, no tub baths,no hot tubs    Eat lightly at first, advance diet as tolerated. Drink plenty of fluids. Keep it covered with a sterile bandage until follow up appointment     Steri-strips  will fall off on their own in about a week. You may shower 24 hours after the procedure. Pat the wound dry after you have washed it with a mild soap. Do not submerge the wound in water until it is well-healed. Take pain medication as directed, if necessary per instructions. Complete ALL antibiotics as directed for entire duration of therapy. Stitches will be left in the skin until your follow up appointment.      Call Your Doctor if any of the following occurs:     Signs of infection, including fever and chills   Redness, swelling, increasing pain, excessive bleeding, or discharge from the incision site   Pain that you cannot control with the medicines you have been given   Any new symptoms

## 2023-05-19 NOTE — ANESTHESIA POSTPROCEDURE EVALUATION
POST- ANESTHESIA EVALUATION       Pt Name: Beth Quinones  MRN: [id number]  [place]: 1935  Date of evaluation: 5/19/2023  Time:  4:30 PM      BP (!) 150/76   Pulse 72   Temp 97.3 °F (36.3 °C) (Temporal)   Resp 14   Ht 5' 1\" (1.549 m)   Wt 113 lb 6.4 oz (51.4 kg)   SpO2 96%   BMI 21.43 kg/m²      Consciousness Level  Awake  Cardiopulmonary Status  Stable  Pain Adequately Treated YES  Nausea / Vomiting  NO  Adequate Hydration  YES  Anesthesia Related Complications NONE      Electronically signed by Catia Vance MD on 5/19/2023 at 4:30 PM       Department of Anesthesiology  Postprocedure Note    Patient: Beth Quinones  MRN: [id number]  [place]: 1935  Date of evaluation: 5/19/2023      Procedure Summary     Date: 05/19/23 Room / Location: [address]) [facility]    Anesthesia Start: 5116 Anesthesia Stop: 3278    Procedures:       LEFT SHOULDER LESION LEFT MID BACK LESION (Left)      LEFT POSTERIOR THIGH LESION EXCISION (Left) Diagnosis:       Neoplasm of uncertain behavior of skin      (Neoplasm of uncertain behavior of skin [D48.5])    Surgeons: Octavia Abdalla DO Responsible Provider: Catia Vance MD    Anesthesia Type: general ASA Status: 3          Anesthesia Type: No value filed.     Margarito Phase I: Margarito Score: 10    Margarito Phase II:        Anesthesia Post Evaluation

## 2023-05-19 NOTE — H&P
HxCC:  81 y/o female presents for evaluation of multiple skin lesions. Patient has noticed lesions on left shoulder, left mid back, and left posterior thigh. Patient with hx of basal cell CA. Patient had multiple benign lesions excised in 2021. Patient states her current left shoulder lesion has been present for 4 years. She feels like she scratched if off a year ago but returned. Patient's  noted the back and thigh lesions. There were no vitals filed for this visit.     Patient Active Problem List   Diagnosis    Hypertension    Bilateral carotid artery disease (HCC)    Hypercholesteremia    Thyroid nodule    Elevated hemoglobin (HCC)    Hx of basal cell carcinoma    Low blood sugar    Mild depression    Left carotid stenosis       Current Facility-Administered Medications   Medication Dose Route Frequency Provider Last Rate Last Admin    ceFAZolin (ANCEF) 2,000 mg in sodium chloride 0.9 % 50 mL IVPB (mini-bag)  2,000 mg IntraVENous Once [employer] IV, DO        lidocaine 1 % injection 1 mL  1 mL IntraDERmal Once PRN Ruperto Licona MD        lactated ringers IV soln infusion   IntraVENous Continuous Jamel Delarosa MD        sodium chloride flush 0.9 % injection 5-40 mL  5-40 mL IntraVENous 2 times per day Ruperto Licona MD        sodium chloride flush 0.9 % injection 5-40 mL  5-40 mL IntraVENous PRN Ruperto Licona MD        0.9 % sodium chloride infusion   IntraVENous PRN Ruperto Licona MD           Allergies   Allergen Reactions    Penicillins Shortness Of Breath     severe    Food Other (See Comments) and Angioedema     Artifical sweeteners, dairy    Monosodium Glutamate Swelling     Heart races    Morphine Itching     Reacted to  IV medication in a PCA pump    Lisinopril Rash       Past Medical History:   Diagnosis Date    Bilateral carotid artery disease (HCC)     Elevated hemoglobin (HCC)     Hx of basal cell carcinoma     lung    Hyperlipidemia

## 2023-05-19 NOTE — ANESTHESIA PRE PROCEDURE
Department of Anesthesiology  Preprocedure Note       Name:  Shadi Pollack   Age:  82 y.o.  :  1935                                          MRN:  [id number]         Date:  2023      Surgeon: Lata Villa):  Gretchen Keenan IV, DO    Procedure: Procedure(s):  LEFT SHOULDER LESION LEFT MID BACK LESION  LEFT POSTERIOR THIGH LESION EXCISION    Medications prior to admission:   Prior to Admission medications    Medication Sig Start Date End Date Taking? Authorizing Provider   FLUTICASONE PROPIONATE, INHAL, IN Inhale into the lungs 2 times daily   Yes Historical Provider, MD   atorvastatin (LIPITOR) 20 MG tablet Take 1 tablet by mouth daily    Historical Provider, MD   Multiple Vitamins-Minerals (THERAPEUTIC MULTIVITAMIN-MINERALS) tablet Take 1 tablet by mouth daily    Historical Provider, MD   amLODIPine (NORVASC) 5 MG tablet Take 1.5 tablets by mouth daily    Historical Provider, MD   aspirin 81 MG EC tablet Take 1 tablet by mouth daily    Historical Provider, MD       Current medications:    Current Facility-Administered Medications   Medication Dose Route Frequency Provider Last Rate Last Admin    ceFAZolin (ANCEF) 2,000 mg in sodium chloride 0.9 % 50 mL IVPB (mini-bag)  2,000 mg IntraVENous Once [employer] IV, DO        lidocaine 1 % injection 1 mL  1 mL IntraDERmal Once PRN Krysta Gilmore MD        lactated ringers IV soln infusion   IntraVENous Continuous Jamel Delarosa MD        sodium chloride flush 0.9 % injection 5-40 mL  5-40 mL IntraVENous 2 times per day Krysta Gilmore MD        sodium chloride flush 0.9 % injection 5-40 mL  5-40 mL IntraVENous PRN Krysta Gilmore MD        0.9 % sodium chloride infusion   IntraVENous PRN Krysta Gilmore MD           Allergies:     Allergies   Allergen Reactions    Penicillins Shortness Of Breath     severe    Food Other (See Comments) and Angioedema     Artifical sweeteners, dairy    Monosodium Glutamate Swelling     Heart

## 2023-05-19 NOTE — OP NOTE
Operative Note      Patient: Lacho Jones  YOB: 1935  MRN: [id number]    Date of Procedure: 5/19/2023    Pre-Op Diagnosis Codes: * Neoplasm of uncertain behavior of skin [D48.5]    Post-Op Diagnosis:   Left posterior thigh lesion  Left upper back lesion  Left shoulder lesion       Procedure:  Excision of left posterior thigh lesion  Excision of left upper back lesion  Excision of left shoulder lesion    Surgeon(s):  Tiffanie Keenan IV,     Assistant:   * No surgical staff found *    Anesthesia: General    Estimated Blood Loss (mL): Minimal    Complications: None    Specimens:   ID Type Source Tests Collected by Time Destination   A : LEFT POSTERIOR THIGH LESION  Tissue Skin SURGICAL PATHOLOGY Luisa Sit Canos IV, DO 5/19/2023 1517    B : LEFT UPPER BACK LESION  Tissue Skin SURGICAL PATHOLOGY Bennetta Sit Canos IV, DO 5/19/2023 1531    C : LEFT SHOULDER LESION  Tissue Skin SURGICAL PATHOLOGY Bennetta Sit Canos IV, DO 5/19/2023 1541        Implants:  * No implants in log *      Drains: * No LDAs found *    Findings: see above  This procedure 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 Detailed Description of Procedure:   Patient was taken to surgery suite and placed on table in supine position. General anesthesia was administered and patient was intubated with an LMA. Patient was then placed in left lateral position. Patient's left shoulder, left upper back, and left posterior thigh was prepped and draped in a sterile fashion. A dose of antibiotics was administered in accordance with SCIP protocol. An appropriate time out was performed prior to skin incision. 0.5% marcaine was infiltrated around all three skin lesions. Elliptical incisions were made around each lesion.   The lesions were

## 2023-05-23 LAB — DERMATOLOGY PATHOLOGY REPORT: NORMAL

## 2025-05-01 ENCOUNTER — TELEPHONE (OUTPATIENT)
Dept: INTERVENTIONAL RADIOLOGY/VASCULAR | Age: 89
End: 2025-05-01

## 2025-05-01 NOTE — TELEPHONE ENCOUNTER
The patient called to schedule a lung biopsy.  A message was left on the office voicemail on 4/25 and 5/1 requesting imaging reports.  I explained to the patient we are unable to have our physician review the request until we receive the reports and images.

## 2025-05-09 ENCOUNTER — TELEPHONE (OUTPATIENT)
Dept: INTERVENTIONAL RADIOLOGY/VASCULAR | Age: 89
End: 2025-05-09

## 2025-05-09 NOTE — TELEPHONE ENCOUNTER
Refill request from pharmacy for Alprazolam 0.5mg 1 tab po at bedtime prn. Last CPX done 6/24/22. Last filled 8/4/22. If approved, please fax to pharmacy, as it is a controlled substance. -BRITTANY   Spoke to Dr. Hooker's office in regard to bx request. Informed nurse of Dr. Mosqueda's response and recommendation. She will inform provider. Information also faxed as requested. See media.

## (undated) DEVICE — SUTURE CHROMIC GUT SZ 2-0 L27IN ABSRB BRN L26MM SH 1/2 CIR G123H

## (undated) DEVICE — STRIP,CLOSURE,WOUND,MEDI-STRIP,1/2X4: Brand: MEDLINE

## (undated) DEVICE — PRESSURE MONITORING SET: Brand: TRUWAVE

## (undated) DEVICE — GAUZE, BORDER, 3"X6", 1.5"X4"PAD, STERIL: Brand: MEDLINE INDUSTRIES, INC.

## (undated) DEVICE — Device

## (undated) DEVICE — SUTURE MCRYL SZ 4-0 L27IN ABSRB UD L19MM PS-2 1/2 CIR PRIM Y426H

## (undated) DEVICE — ELECTRODE PT RET AD L9FT HI MOIST COND ADH HYDRGEL CORDED

## (undated) DEVICE — MHPB GEN MINOR PACK: Brand: MEDLINE INDUSTRIES, INC.

## (undated) DEVICE — SOLUTION PREP 4OZ 3% H PEROX 1ST AID ANTISEP ORAL DEBRIDING

## (undated) DEVICE — TUBING, SUCTION, 9/32" X 20', STRAIGHT: Brand: MEDLINE INDUSTRIES, INC.

## (undated) DEVICE — PENCIL ES L3M BTTN SWCH HOLSTER W/ BLDE ELECTRD EDGE

## (undated) DEVICE — SUTURE VCRL SZ 3-0 L18IN ABSRB UD PS-2 L19MM 1/2 CIR J497G

## (undated) DEVICE — UNDERPANTS MAT L/XL KNIT SEAMLESS CLR CODE WAISTBAND

## (undated) DEVICE — INTENDED FOR TISSUE SEPARATION, AND OTHER PROCEDURES THAT REQUIRE A SHARP SURGICAL BLADE TO PUNCTURE OR CUT.: Brand: BARD-PARKER ® CARBON RIB-BACK BLADES

## (undated) DEVICE — CORD,CAUTERY,BIPOLAR,STERILE: Brand: MEDLINE

## (undated) DEVICE — ADHESIVE SKIN CLSR 0.7ML TOP DERMBND ADV

## (undated) DEVICE — SPONGE LAP W18XL18IN WHT COT 4 PLY FLD STRUNG RADPQ DISP ST

## (undated) DEVICE — MHPB HAND AND FOOT PACK: Brand: MEDLINE INDUSTRIES, INC.

## (undated) DEVICE — YANKAUER,FLEXIBLE HANDLE,REGLR CAPACITY: Brand: MEDLINE INDUSTRIES, INC.

## (undated) DEVICE — SOLUTION IRRIG 1000ML 0.9% SOD CHL USP POUR PLAS BTL

## (undated) DEVICE — COUNTER NDL 10 COUNT HLD 20 FOAM BLK SGL MAG

## (undated) DEVICE — 1016 S-DRAPE IRRIG POUCH 10/BOX: Brand: STERI-DRAPE™

## (undated) DEVICE — SOLUTION IV IRRIG POUR BRL 0.9% SODIUM CHL 2F7124

## (undated) DEVICE — SUTURE CHROMIC GUT SZ 3-0 L27IN ABSRB BRN L26MM SH 1/2 CIR G122H

## (undated) DEVICE — GLOVE ORANGE PI 7 1/2   MSG9075

## (undated) DEVICE — DRESSING TRNSPAR W2XL2.75IN FLM SHT SEMIPERMEABLE WIND

## (undated) DEVICE — SOLUTION IV 500ML 0.9% SOD CHL PH 5 INJ USP VIAFLX PLAS

## (undated) DEVICE — SET CATH 20GA L1.75IN RAD ART POLYUR RADPQ W/ INTEGR

## (undated) DEVICE — STANDARD HYPODERMIC NEEDLE,POLYPROPYLENE HUB: Brand: MONOJECT

## (undated) DEVICE — STERILE POLYISOPRENE POWDER-FREE SURGICAL GLOVES WITH EMOLLIENT COATING: Brand: PROTEXIS

## (undated) DEVICE — POSITIONER HD W8XH4XL8.5IN RASPBERRY FOAM SLT

## (undated) DEVICE — BLADE ES ELASTOMERIC COAT INSUL DURABLE BEND UPTO 90DEG

## (undated) DEVICE — SHUNT CV L30CM DIA3X5MM SIL EXT LOOP FULL SPR REINF SUNDT

## (undated) DEVICE — SUTURE VCRL + SZ 3-0 L27IN ABSRB UD L26MM SH 1/2 CIR VCP416H

## (undated) DEVICE — SUTURE VCRL SZ 3-0 L54IN ABSRB UD LIGAPAK REEL POLYGLACTIN J285G

## (undated) DEVICE — SUTURE PROL SZ 6-0 L30IN NONABSORBABLE BLU L13MM RB-2 1/2 8711H

## (undated) DEVICE — FLUFF UNDERPAD,MODERATE: Brand: SIMPLICITY

## (undated) DEVICE — SUTURE MCRYL + SZ 4 0 L18IN ABSRB UD PC 3 L16MM 3 8 CIR PRIM MCP845G

## (undated) DEVICE — ADAPTER CIRC OD22XID15MM FOR MON VENT PARAMETER

## (undated) DEVICE — SUTURE PERMAHAND SZ 0 L30IN NONABSORBABLE BLK SILK BRAID A306H

## (undated) DEVICE — SUTURE MCRYL + SZ 4-0 L27IN ABSRB UD L19MM PS-2 3/8 CIR MCP426H

## (undated) DEVICE — ADHESIVE SKIN CLOSURE TOP 36 CC HI VISC DERMBND MINI

## (undated) DEVICE — SLEEVE COMPRESS UNIV KNEE SCD SEQUENTIAL

## (undated) DEVICE — DRESSING TRNSPAR W5XL4.5IN FLM SHT SEMIPERMEABLE WIND

## (undated) DEVICE — APPLICATOR MEDICATED 26 CC SOLUTION HI LT ORNG CHLORAPREP

## (undated) DEVICE — SPONGE GZ W2XL2IN NONWOVEN 4 PLY FASTER WICKING ABIL AVANT

## (undated) DEVICE — SUTURE CHROMIC GUT SZ 2-0 L27IN ABSRB BRN L36MM CT-1 1/2 811H

## (undated) DEVICE — FORCEPS BX L240CM JAW DIA2.4MM ORNG L CAP W/ NDL DISP RAD

## (undated) DEVICE — SYRINGE MED 10ML TRNSLUC BRL PLUNG BLK MRK POLYPR CTRL

## (undated) DEVICE — STRAP,POSITIONING,KNEE/BODY,FOAM,4X60": Brand: MEDLINE

## (undated) DEVICE — DRAPE,REIN 53X77,STERILE: Brand: MEDLINE

## (undated) DEVICE — APPLICATOR MEDICATED 10.5 CC SOLUTION HI LT ORNG CHLORAPREP

## (undated) DEVICE — SUTURE PERMA-HAND SZ 2-0 L30IN NONABSORBABLE BLK L26MM SH K833H

## (undated) DEVICE — PAD,NON-ADHERENT,3X8,STERILE,LF,1/PK: Brand: MEDLINE

## (undated) DEVICE — MARKER,SKIN,WI/RULER AND LABELS: Brand: MEDLINE

## (undated) DEVICE — DRAPE,UNDRBUT,WHT GRAD PCH,CAPPORT,20/CS: Brand: MEDLINE

## (undated) DEVICE — DRAPE,THYROID,SOFT,STERILE: Brand: MEDLINE

## (undated) DEVICE — CONTAINER,SPECIMEN,OR STERILE,4OZ: Brand: MEDLINE

## (undated) DEVICE — MASTISOL ADHESIVE AMPULE

## (undated) DEVICE — SYRINGE IRRIG 60ML SFT PLIABLE BLB EZ TO GRP 1 HND USE W/

## (undated) DEVICE — GLOVE SURG SZ 8 L11.77IN FNGR THK9.8MIL STRW LTX POLYMER

## (undated) DEVICE — PAD,ABDOMINAL,5"X9",ST,LF,25/BX: Brand: MEDLINE INDUSTRIES, INC.

## (undated) DEVICE — ELECTRODE ES L3IN S STL BLDE INSUL DISP VALLEYLAB EDGE

## (undated) DEVICE — SUTURE NONABSORBABLE MONOFILAMENT 6-0 BV-1 1X30 IN PROLENE 8709H

## (undated) DEVICE — PACK PROCEDURE SURG GEN MIN SVMMC

## (undated) DEVICE — GAUZE,SPONGE,4"X4",16PLY,STRL,LF,10/TRAY: Brand: MEDLINE

## (undated) DEVICE — GAUZE,SPONGE,FLUFF,6"X6.75",STRL,5/TRAY: Brand: MEDLINE

## (undated) DEVICE — BLANKET WRM W40.2XL55.9IN IORT LO BODY + MISTRAL AIR

## (undated) DEVICE — GARMENT,MEDLINE,DVT,INT,CALF,MED, GEN2: Brand: MEDLINE

## (undated) DEVICE — SUTURE VCRL SZ 3-0 L27IN ABSRB UD L26MM SH 1/2 CIR J416H